# Patient Record
Sex: FEMALE | Race: BLACK OR AFRICAN AMERICAN | URBAN - METROPOLITAN AREA
[De-identification: names, ages, dates, MRNs, and addresses within clinical notes are randomized per-mention and may not be internally consistent; named-entity substitution may affect disease eponyms.]

---

## 2020-07-01 ENCOUNTER — APPOINTMENT (OUTPATIENT)
Dept: GENERAL RADIOLOGY | Age: 55
End: 2020-07-01
Payer: MEDICAID

## 2020-07-01 ENCOUNTER — HOSPITAL ENCOUNTER (EMERGENCY)
Age: 55
Discharge: HOME | End: 2020-07-01
Payer: MEDICAID

## 2020-07-01 VITALS
TEMPERATURE: 97.6 F | WEIGHT: 211.86 LBS | SYSTOLIC BLOOD PRESSURE: 151 MMHG | HEIGHT: 64 IN | BODY MASS INDEX: 36.17 KG/M2 | DIASTOLIC BLOOD PRESSURE: 88 MMHG | HEART RATE: 88 BPM | OXYGEN SATURATION: 97 % | RESPIRATION RATE: 18 BRPM

## 2020-07-01 PROCEDURE — 93005 ELECTROCARDIOGRAM TRACING: CPT | Performed by: PHYSICIAN ASSISTANT

## 2020-07-01 PROCEDURE — 93005 ELECTROCARDIOGRAM TRACING: CPT

## 2020-07-01 PROCEDURE — 71101 X-RAY EXAM UNILAT RIBS/CHEST: CPT

## 2020-07-01 PROCEDURE — 99284 EMERGENCY DEPT VISIT MOD MDM: CPT

## 2020-07-01 PROCEDURE — 6370000000 HC RX 637 (ALT 250 FOR IP): Performed by: PHYSICIAN ASSISTANT

## 2020-07-01 RX ORDER — NAPROXEN 250 MG/1
500 TABLET ORAL ONCE
Status: COMPLETED | OUTPATIENT
Start: 2020-07-01 | End: 2020-07-01

## 2020-07-01 RX ORDER — CYCLOBENZAPRINE HCL 10 MG
10 TABLET ORAL ONCE
Status: COMPLETED | OUTPATIENT
Start: 2020-07-01 | End: 2020-07-01

## 2020-07-01 RX ORDER — ACETAMINOPHEN 500 MG
500 TABLET ORAL EVERY 6 HOURS PRN
Qty: 30 TABLET | Refills: 0 | Status: SHIPPED | OUTPATIENT
Start: 2020-07-01

## 2020-07-01 RX ORDER — HYDROCODONE BITARTRATE AND ACETAMINOPHEN 5; 325 MG/1; MG/1
1 TABLET ORAL EVERY 8 HOURS PRN
Qty: 6 TABLET | Refills: 0 | Status: SHIPPED | OUTPATIENT
Start: 2020-07-01 | End: 2020-07-04

## 2020-07-01 RX ORDER — OXYCODONE HYDROCHLORIDE AND ACETAMINOPHEN 5; 325 MG/1; MG/1
1 TABLET ORAL ONCE
Status: COMPLETED | OUTPATIENT
Start: 2020-07-01 | End: 2020-07-01

## 2020-07-01 RX ORDER — LIDOCAINE 50 MG/G
1 PATCH TOPICAL DAILY
Qty: 10 PATCH | Refills: 0 | Status: SHIPPED | OUTPATIENT
Start: 2020-07-01 | End: 2020-07-11

## 2020-07-01 RX ORDER — LIDOCAINE 4 G/G
1 PATCH TOPICAL DAILY
Status: DISCONTINUED | OUTPATIENT
Start: 2020-07-01 | End: 2020-07-01 | Stop reason: HOSPADM

## 2020-07-01 RX ADMIN — NAPROXEN 500 MG: 250 TABLET ORAL at 14:37

## 2020-07-01 RX ADMIN — OXYCODONE HYDROCHLORIDE AND ACETAMINOPHEN 1 TABLET: 5; 325 TABLET ORAL at 14:37

## 2020-07-01 RX ADMIN — CYCLOBENZAPRINE 10 MG: 10 TABLET, FILM COATED ORAL at 14:37

## 2020-07-01 ASSESSMENT — PAIN DESCRIPTION - DESCRIPTORS
DESCRIPTORS: ACHING;CONSTANT;SHARP
DESCRIPTORS: DULL;ACHING

## 2020-07-01 ASSESSMENT — PAIN DESCRIPTION - ONSET
ONSET: ON-GOING
ONSET: ON-GOING

## 2020-07-01 ASSESSMENT — PAIN DESCRIPTION - PAIN TYPE
TYPE: ACUTE PAIN
TYPE: ACUTE PAIN

## 2020-07-01 ASSESSMENT — ENCOUNTER SYMPTOMS
BACK PAIN: 0
VOMITING: 0
DIARRHEA: 0
SHORTNESS OF BREATH: 0
ABDOMINAL PAIN: 0
COUGH: 0
NAUSEA: 0
EYE PAIN: 0

## 2020-07-01 ASSESSMENT — PAIN DESCRIPTION - LOCATION
LOCATION: BACK
LOCATION: RIB CAGE

## 2020-07-01 ASSESSMENT — PAIN DESCRIPTION - PROGRESSION
CLINICAL_PROGRESSION: GRADUALLY WORSENING
CLINICAL_PROGRESSION: GRADUALLY IMPROVING

## 2020-07-01 ASSESSMENT — PAIN SCALES - GENERAL
PAINLEVEL_OUTOF10: 10
PAINLEVEL_OUTOF10: 1
PAINLEVEL_OUTOF10: 10

## 2020-07-01 ASSESSMENT — PAIN DESCRIPTION - FREQUENCY
FREQUENCY: CONTINUOUS
FREQUENCY: CONTINUOUS

## 2020-07-01 ASSESSMENT — PAIN - FUNCTIONAL ASSESSMENT
PAIN_FUNCTIONAL_ASSESSMENT: PREVENTS OR INTERFERES SOME ACTIVE ACTIVITIES AND ADLS
PAIN_FUNCTIONAL_ASSESSMENT: 0-10
PAIN_FUNCTIONAL_ASSESSMENT: ACTIVITIES ARE NOT PREVENTED

## 2020-07-01 ASSESSMENT — PAIN DESCRIPTION - ORIENTATION
ORIENTATION: LEFT
ORIENTATION: LEFT

## 2020-07-01 ASSESSMENT — PAIN SCALES - WONG BAKER: WONGBAKER_NUMERICALRESPONSE: 10
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
## 2020-07-01 NOTE — ED PROVIDER NOTES
Attending Note:    The patient was seen and examined by the mid-level provider. I was asked to provide EKG interpretation only by the midlevel provider. DIAGNOSTIC RESULTS     EKG: All EKG's are interpreted by the Emergency Department Physician who either signs or Co-signs this chart in the absence of a cardiologist.    Normal sinus rhythm. Rate 66. AR interval 176 ms. QRS duration 98 ms. QTc 448 ms. R axis -19 degrees. No ST elevation. No acute change.          Florecita Kelly DO  07/01/20 9046

## 2020-07-02 LAB
EKG ATRIAL RATE: 66 BPM
EKG DIAGNOSIS: NORMAL
EKG P AXIS: 57 DEGREES
EKG P-R INTERVAL: 176 MS
EKG Q-T INTERVAL: 428 MS
EKG QRS DURATION: 98 MS
EKG QTC CALCULATION (BAZETT): 448 MS
EKG R AXIS: -19 DEGREES
EKG T AXIS: 38 DEGREES
EKG VENTRICULAR RATE: 66 BPM

## 2020-07-02 PROCEDURE — 93010 ELECTROCARDIOGRAM REPORT: CPT | Performed by: INTERNAL MEDICINE

## 2020-07-09 ENCOUNTER — APPOINTMENT (OUTPATIENT)
Dept: CT IMAGING | Age: 55
End: 2020-07-09
Payer: MEDICAID

## 2020-07-09 ENCOUNTER — APPOINTMENT (OUTPATIENT)
Dept: GENERAL RADIOLOGY | Age: 55
End: 2020-07-09
Payer: MEDICAID

## 2020-07-09 ENCOUNTER — HOSPITAL ENCOUNTER (EMERGENCY)
Age: 55
Discharge: HOME | End: 2020-07-09
Attending: EMERGENCY MEDICINE
Payer: MEDICAID

## 2020-07-09 VITALS
HEIGHT: 64 IN | SYSTOLIC BLOOD PRESSURE: 123 MMHG | BODY MASS INDEX: 36.17 KG/M2 | RESPIRATION RATE: 17 BRPM | WEIGHT: 211.86 LBS | OXYGEN SATURATION: 98 % | DIASTOLIC BLOOD PRESSURE: 70 MMHG | TEMPERATURE: 98 F | HEART RATE: 72 BPM

## 2020-07-09 LAB
ANION GAP SERPL CALCULATED.3IONS-SCNC: 15 MMOL/L (ref 3–16)
BASOPHILS ABSOLUTE: 0 K/UL (ref 0–0.2)
BASOPHILS RELATIVE PERCENT: 0.5 %
BUN BLDV-MCNC: 9 MG/DL (ref 7–20)
CALCIUM SERPL-MCNC: 9.5 MG/DL (ref 8.3–10.6)
CHLORIDE BLD-SCNC: 104 MMOL/L (ref 99–110)
CO2: 19 MMOL/L (ref 21–32)
CREAT SERPL-MCNC: 0.6 MG/DL (ref 0.6–1.1)
EKG ATRIAL RATE: 69 BPM
EKG DIAGNOSIS: NORMAL
EKG P AXIS: 59 DEGREES
EKG P-R INTERVAL: 136 MS
EKG Q-T INTERVAL: 414 MS
EKG QRS DURATION: 96 MS
EKG QTC CALCULATION (BAZETT): 443 MS
EKG R AXIS: -11 DEGREES
EKG T AXIS: 68 DEGREES
EKG VENTRICULAR RATE: 69 BPM
EOSINOPHILS ABSOLUTE: 0.1 K/UL (ref 0–0.6)
EOSINOPHILS RELATIVE PERCENT: 1.4 %
GFR AFRICAN AMERICAN: >60
GFR NON-AFRICAN AMERICAN: >60
GLUCOSE BLD-MCNC: 83 MG/DL (ref 70–99)
HCT VFR BLD CALC: 42 % (ref 36–48)
HEMOGLOBIN: 14.1 G/DL (ref 12–16)
LYMPHOCYTES ABSOLUTE: 1.7 K/UL (ref 1–5.1)
LYMPHOCYTES RELATIVE PERCENT: 30 %
MCH RBC QN AUTO: 32.5 PG (ref 26–34)
MCHC RBC AUTO-ENTMCNC: 33.5 G/DL (ref 31–36)
MCV RBC AUTO: 97.3 FL (ref 80–100)
MONOCYTES ABSOLUTE: 0.4 K/UL (ref 0–1.3)
MONOCYTES RELATIVE PERCENT: 6.8 %
NEUTROPHILS ABSOLUTE: 3.4 K/UL (ref 1.7–7.7)
NEUTROPHILS RELATIVE PERCENT: 61.3 %
PDW BLD-RTO: 13.6 % (ref 12.4–15.4)
PLATELET # BLD: 225 K/UL (ref 135–450)
PMV BLD AUTO: 9 FL (ref 5–10.5)
POTASSIUM SERPL-SCNC: 3.6 MMOL/L (ref 3.5–5.1)
RBC # BLD: 4.32 M/UL (ref 4–5.2)
SARS-COV-2, NAAT: NOT DETECTED
SODIUM BLD-SCNC: 138 MMOL/L (ref 136–145)
WBC # BLD: 5.6 K/UL (ref 4–11)

## 2020-07-09 PROCEDURE — 80048 BASIC METABOLIC PNL TOTAL CA: CPT

## 2020-07-09 PROCEDURE — 93010 ELECTROCARDIOGRAM REPORT: CPT | Performed by: INTERNAL MEDICINE

## 2020-07-09 PROCEDURE — 85025 COMPLETE CBC W/AUTO DIFF WBC: CPT

## 2020-07-09 PROCEDURE — 70450 CT HEAD/BRAIN W/O DYE: CPT

## 2020-07-09 PROCEDURE — 6370000000 HC RX 637 (ALT 250 FOR IP): Performed by: PHYSICIAN ASSISTANT

## 2020-07-09 PROCEDURE — 36415 COLL VENOUS BLD VENIPUNCTURE: CPT

## 2020-07-09 PROCEDURE — 99284 EMERGENCY DEPT VISIT MOD MDM: CPT

## 2020-07-09 PROCEDURE — 93005 ELECTROCARDIOGRAM TRACING: CPT | Performed by: PHYSICIAN ASSISTANT

## 2020-07-09 PROCEDURE — 71101 X-RAY EXAM UNILAT RIBS/CHEST: CPT

## 2020-07-09 PROCEDURE — U0002 COVID-19 LAB TEST NON-CDC: HCPCS

## 2020-07-09 RX ORDER — ACETAMINOPHEN 325 MG/1
650 TABLET ORAL ONCE
Status: COMPLETED | OUTPATIENT
Start: 2020-07-09 | End: 2020-07-09

## 2020-07-09 RX ORDER — HYDROCODONE BITARTRATE AND ACETAMINOPHEN 5; 325 MG/1; MG/1
1 TABLET ORAL EVERY 4 HOURS PRN
Qty: 18 TABLET | Refills: 0 | Status: SHIPPED | OUTPATIENT
Start: 2020-07-09 | End: 2020-07-12

## 2020-07-09 RX ADMIN — ACETAMINOPHEN 650 MG: 325 TABLET, FILM COATED ORAL at 14:37

## 2020-07-09 ASSESSMENT — PAIN SCALES - GENERAL
PAINLEVEL_OUTOF10: 10
PAINLEVEL_OUTOF10: 10

## 2020-07-09 ASSESSMENT — ENCOUNTER SYMPTOMS
COLOR CHANGE: 0
VOMITING: 0
NAUSEA: 0
ABDOMINAL PAIN: 0
BACK PAIN: 0
SHORTNESS OF BREATH: 0
COUGH: 0

## 2020-07-09 ASSESSMENT — PAIN DESCRIPTION - PROGRESSION: CLINICAL_PROGRESSION: NOT CHANGED

## 2020-07-09 ASSESSMENT — PAIN DESCRIPTION - ORIENTATION: ORIENTATION: ANTERIOR

## 2020-07-09 ASSESSMENT — PAIN DESCRIPTION - FREQUENCY: FREQUENCY: CONTINUOUS

## 2020-07-09 ASSESSMENT — PAIN DESCRIPTION - ONSET: ONSET: ON-GOING

## 2020-07-09 ASSESSMENT — PAIN - FUNCTIONAL ASSESSMENT: PAIN_FUNCTIONAL_ASSESSMENT: PREVENTS OR INTERFERES SOME ACTIVE ACTIVITIES AND ADLS

## 2020-07-09 ASSESSMENT — PAIN DESCRIPTION - PAIN TYPE: TYPE: ACUTE PAIN

## 2020-07-09 ASSESSMENT — PAIN DESCRIPTION - LOCATION: LOCATION: HEAD
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
## 2023-01-13 ENCOUNTER — APPOINTMENT (OUTPATIENT)
Dept: GENERAL RADIOLOGY | Age: 58
End: 2023-01-13
Payer: MEDICAID

## 2023-01-13 ENCOUNTER — HOSPITAL ENCOUNTER (EMERGENCY)
Age: 58
Discharge: HOME | End: 2023-01-13
Payer: MEDICAID

## 2023-01-13 VITALS
TEMPERATURE: 97.7 F | HEART RATE: 98 BPM | OXYGEN SATURATION: 98 % | DIASTOLIC BLOOD PRESSURE: 77 MMHG | HEIGHT: 64 IN | SYSTOLIC BLOOD PRESSURE: 144 MMHG | WEIGHT: 178.35 LBS | RESPIRATION RATE: 16 BRPM | BODY MASS INDEX: 30.45 KG/M2

## 2023-01-13 DIAGNOSIS — R06.02 SHORTNESS OF BREATH: Primary | ICD-10-CM

## 2023-01-13 LAB
A/G RATIO: 0.9 (ref 1.1–2.2)
ALBUMIN SERPL-MCNC: 3.5 G/DL (ref 3.4–5)
ALP BLD-CCNC: 214 U/L (ref 40–129)
ALT SERPL-CCNC: 36 U/L (ref 10–40)
ANION GAP SERPL CALCULATED.3IONS-SCNC: 12 MMOL/L (ref 3–16)
AST SERPL-CCNC: 46 U/L (ref 15–37)
BASOPHILS ABSOLUTE: 0 K/UL (ref 0–0.2)
BASOPHILS RELATIVE PERCENT: 0.4 %
BILIRUB SERPL-MCNC: 0.7 MG/DL (ref 0–1)
BUN BLDV-MCNC: 5 MG/DL (ref 7–20)
CALCIUM SERPL-MCNC: 9.7 MG/DL (ref 8.3–10.6)
CHLORIDE BLD-SCNC: 101 MMOL/L (ref 99–110)
CO2: 26 MMOL/L (ref 21–32)
CREAT SERPL-MCNC: 1 MG/DL (ref 0.6–1.1)
EOSINOPHILS ABSOLUTE: 0.1 K/UL (ref 0–0.6)
EOSINOPHILS RELATIVE PERCENT: 1.3 %
GFR SERPL CREATININE-BSD FRML MDRD: >60 ML/MIN/{1.73_M2}
GLUCOSE BLD-MCNC: 89 MG/DL (ref 70–99)
HCT VFR BLD CALC: 33.5 % (ref 36–48)
HEMOGLOBIN: 10.6 G/DL (ref 12–16)
LYMPHOCYTES ABSOLUTE: 1.1 K/UL (ref 1–5.1)
LYMPHOCYTES RELATIVE PERCENT: 15.3 %
MAGNESIUM: 2.8 MG/DL (ref 1.8–2.4)
MCH RBC QN AUTO: 25.8 PG (ref 26–34)
MCHC RBC AUTO-ENTMCNC: 31.7 G/DL (ref 31–36)
MCV RBC AUTO: 81.5 FL (ref 80–100)
MONOCYTES ABSOLUTE: 0.4 K/UL (ref 0–1.3)
MONOCYTES RELATIVE PERCENT: 5.4 %
NEUTROPHILS ABSOLUTE: 5.8 K/UL (ref 1.7–7.7)
NEUTROPHILS RELATIVE PERCENT: 77.6 %
PDW BLD-RTO: 16.6 % (ref 12.4–15.4)
PLATELET # BLD: 472 K/UL (ref 135–450)
PMV BLD AUTO: 7.7 FL (ref 5–10.5)
POTASSIUM REFLEX MAGNESIUM: 3.5 MMOL/L (ref 3.5–5.1)
PRO-BNP: 50 PG/ML (ref 0–124)
RAPID INFLUENZA  B AGN: NEGATIVE
RAPID INFLUENZA A AGN: NEGATIVE
RBC # BLD: 4.11 M/UL (ref 4–5.2)
SARS-COV-2, NAAT: NOT DETECTED
SODIUM BLD-SCNC: 139 MMOL/L (ref 136–145)
TOTAL PROTEIN: 7.3 G/DL (ref 6.4–8.2)
TROPONIN: <0.01 NG/ML
WBC # BLD: 7.5 K/UL (ref 4–11)

## 2023-01-13 PROCEDURE — 84484 ASSAY OF TROPONIN QUANT: CPT

## 2023-01-13 PROCEDURE — 94640 AIRWAY INHALATION TREATMENT: CPT

## 2023-01-13 PROCEDURE — 85025 COMPLETE CBC W/AUTO DIFF WBC: CPT

## 2023-01-13 PROCEDURE — 87635 SARS-COV-2 COVID-19 AMP PRB: CPT

## 2023-01-13 PROCEDURE — 71046 X-RAY EXAM CHEST 2 VIEWS: CPT

## 2023-01-13 PROCEDURE — 83880 ASSAY OF NATRIURETIC PEPTIDE: CPT

## 2023-01-13 PROCEDURE — 83735 ASSAY OF MAGNESIUM: CPT

## 2023-01-13 PROCEDURE — 99285 EMERGENCY DEPT VISIT HI MDM: CPT

## 2023-01-13 PROCEDURE — 80053 COMPREHEN METABOLIC PANEL: CPT

## 2023-01-13 PROCEDURE — 6370000000 HC RX 637 (ALT 250 FOR IP): Performed by: PHYSICIAN ASSISTANT

## 2023-01-13 PROCEDURE — 87804 INFLUENZA ASSAY W/OPTIC: CPT

## 2023-01-13 PROCEDURE — 93005 ELECTROCARDIOGRAM TRACING: CPT | Performed by: PHYSICIAN ASSISTANT

## 2023-01-13 PROCEDURE — 94760 N-INVAS EAR/PLS OXIMETRY 1: CPT

## 2023-01-13 RX ORDER — PREDNISONE 20 MG/1
20 TABLET ORAL 2 TIMES DAILY
Qty: 10 TABLET | Refills: 0 | Status: SHIPPED | OUTPATIENT
Start: 2023-01-13 | End: 2023-01-18

## 2023-01-13 RX ORDER — AZITHROMYCIN 250 MG/1
250 TABLET, FILM COATED ORAL SEE ADMIN INSTRUCTIONS
Qty: 6 TABLET | Refills: 0 | Status: SHIPPED | OUTPATIENT
Start: 2023-01-13 | End: 2023-01-18

## 2023-01-13 RX ORDER — IPRATROPIUM BROMIDE AND ALBUTEROL SULFATE 2.5; .5 MG/3ML; MG/3ML
1 SOLUTION RESPIRATORY (INHALATION) ONCE
Status: COMPLETED | OUTPATIENT
Start: 2023-01-13 | End: 2023-01-13

## 2023-01-13 RX ADMIN — IPRATROPIUM BROMIDE AND ALBUTEROL SULFATE 1 AMPULE: .5; 3 SOLUTION RESPIRATORY (INHALATION) at 15:50

## 2023-01-13 ASSESSMENT — PAIN - FUNCTIONAL ASSESSMENT: PAIN_FUNCTIONAL_ASSESSMENT: NONE - DENIES PAIN

## 2023-01-14 ASSESSMENT — ENCOUNTER SYMPTOMS
CONSTIPATION: 0
SHORTNESS OF BREATH: 1
COUGH: 1
ABDOMINAL PAIN: 0
SORE THROAT: 0
VOMITING: 0
EYE PAIN: 0
EYE REDNESS: 0
BACK PAIN: 0
NAUSEA: 0
DIARRHEA: 0

## 2023-01-14 NOTE — ED PROVIDER NOTES
[address] [facility]        Pt Name: Johnny Martinez  MRN: [id number]  [place] 1965  Date of evaluation: 1/13/2023  Provider: Ann Parnell PA-C  PCP: XIN Ashby CNP  Note Started: 8:13 AM EST 1/14/23      ANILA. I have evaluated this patient. My supervising physician was available for consultation. CHIEF COMPLAINT       Chief Complaint   Patient presents with    Shortness of Breath     HIV+, SOB x2-3 days, + dry cough, states that it is hard for her to talk at times       HISTORY OF PRESENT ILLNESS: 1 or more Elements     History from : Patient    Limitations to history : None    Johnny Martinez is a 65 y.o. female who presents to the emergency department with complaint of a cough and some shortness of breath that has been present over the past 2 to 3 days. Been about the same since it started. She states that most of the shortness of breath stems from the throat rather than the chest itself. When she takes her inhalers at home this improves her symptoms. She denies any fevers or chills, productive cough, chest pain, abdominal pain, change in urination or bowel movements. She does have a PMH of HIV and has not seen a physician in the past year. Nursing Notes were all reviewed and agreed with or any disagreements were addressed in the HPI. REVIEW OF SYSTEMS :      Review of Systems   Constitutional:  Negative for chills and fever. HENT:  Negative for ear pain and sore throat. Eyes:  Negative for pain and redness. Respiratory:  Positive for cough and shortness of breath. Cardiovascular:  Negative for chest pain and leg swelling. Gastrointestinal:  Negative for abdominal pain, constipation, diarrhea, nausea and vomiting. Genitourinary:  Negative for dysuria and hematuria. Musculoskeletal:  Negative for back pain and neck pain. Skin:  Negative for rash and wound.    Neurological:  Negative for light-headedness and headaches. Positives and Pertinent negatives as per HPI. SURGICAL HISTORY   History reviewed. No pertinent surgical history. [address]       Discharge Medication List as of 1/13/2023  5:28 PM        CONTINUE these medications which have NOT CHANGED    Details   acetaminophen (APAP EXTRA STRENGTH) 500 MG tablet Take 1 tablet by mouth every 6 hours as needed for Pain, Disp-30 tablet, R-0Print             ALLERGIES     Patient has no known allergies. FAMILYHISTORY     History reviewed. No pertinent family history. SOCIAL HISTORY       Social History     Tobacco Use    Smoking status: Never    Smokeless tobacco: Never   Substance Use Topics    Alcohol use: Not Currently    Drug use: Not Currently       SCREENINGS        Mary Coma Scale  Eye Opening: Spontaneous  Best Verbal Response: Oriented  Best Motor Response: Obeys commands  Mary Coma Scale Score: 15                CIWA Assessment  BP: (!) 144/77  Heart Rate: 98           PHYSICAL EXAM  1 or more Elements     ED Triage Vitals [01/13/23 1227]   BP Temp Temp Source Heart Rate Resp SpO2 Height Weight   (!) 151/87 97.7 °F (36.5 °C) Oral 99 20 96 % 5' 4\" (1.626 m) 178 lb 5.6 oz (80.9 kg)       Physical Exam  Constitutional:       General: She is not in acute distress. Appearance: Normal appearance. She is not ill-appearing, toxic-appearing or diaphoretic. HENT:      Head: Normocephalic and atraumatic. Right Ear: External ear normal.      Left Ear: External ear normal.      Nose: Nose normal.   Eyes:      General:         Right eye: No discharge. Left eye: No discharge. Cardiovascular:      Rate and Rhythm: Normal rate and regular rhythm. Pulses: Normal pulses. Heart sounds: Normal heart sounds. No murmur heard. No gallop. Pulmonary:      Effort: Pulmonary effort is normal. No respiratory distress. Breath sounds: Normal breath sounds. No stridor. No wheezing, rhonchi or rales. Musculoskeletal:         General: Normal range of motion. Cervical back: Normal range of motion. Skin:     General: Skin is warm and dry. Neurological:      General: No focal deficit present. Mental Status: She is alert and oriented to person, place, and time. Psychiatric:         Mood and Affect: Mood normal.         Behavior: Behavior normal.       DIAGNOSTIC RESULTS   LABS:    Labs Reviewed   CBC WITH AUTO DIFFERENTIAL - Abnormal; Notable for the following components:       Result Value    Hemoglobin 10.6 (*)     Hematocrit 33.5 (*)     MCH 25.8 (*)     RDW 16.6 (*)     Platelets 038 (*)     All other components within normal limits   COMPREHENSIVE METABOLIC PANEL W/ REFLEX TO MG FOR LOW K - Abnormal; Notable for the following components:    BUN 5 (*)     Albumin/Globulin Ratio 0.9 (*)     Alkaline Phosphatase 214 (*)     AST 46 (*)     All other components within normal limits   MAGNESIUM - Abnormal; Notable for the following components:    Magnesium 2.80 (*)     All other components within normal limits   COVID-19, RAPID   RAPID INFLUENZA A/B ANTIGENS   BRAIN NATRIURETIC PEPTIDE   TROPONIN       When ordered only abnormal lab results are displayed. All other labs were within normal range or not returned as of this dictation. EKG: When ordered, EKG's are interpreted by the Emergency Department Physician in the absence of a cardiologist.  Please see their note for interpretation of EKG. RADIOLOGY:   Non-plain film images such as CT, Ultrasound and MRI are read by the radiologist. Plain radiographic images are visualized and preliminarily interpreted by the ED Provider with the below findings:        Interpretation per the Radiologist below, if available at the time of this note:    XR CHEST (2 VW)   Final Result   No acute abnormality           XR CHEST (2 VW)    Result Date: 1/13/2023  EXAMINATION: TWO XRAY VIEWS OF THE CHEST 1/13/2023 2:28 pm COMPARISON: None.  HISTORY: ORDERING SYSTEM PROVIDED HISTORY: shortness of breath TECHNOLOGIST PROVIDED HISTORY: Reason for exam:->shortness of breath Reason for Exam: SOB FINDINGS: The heart and pulmonary vascularity are within normal limits. There are no focal areas of consolidation or pleural effusion. The osseous structures are intact. No acute abnormality       No results found. PROCEDURES   Unless otherwise noted below, none     Procedures    CRITICAL CARE TIME (.cctime)       PAST MEDICAL HISTORY      has a past medical history of AIDS (acquired immune deficiency syndrome) ([facility] [address] Chronic Conditions affecting Care:    EMERGENCY DEPARTMENT COURSE and DIFFERENTIAL DIAGNOSIS/MDM:   Vitals:    Vitals:    01/13/23 1227 01/13/23 1550 01/13/23 1730   BP: (!) 151/87  (!) 144/77   Pulse: 99  98   Resp: 20 18 16   Temp: 97.7 °F (36.5 °C)     TempSrc: Oral     SpO2: 96% 99% 98%   Weight: 178 lb 5.6 oz (80.9 kg)     Height: 5' 4\" (1.626 m)         Patient was given the following medications:  Medications   ipratropium-albuterol (DUONEB) nebulizer solution 1 ampule (1 ampule Inhalation Given 1/13/23 1550)             Is this patient to be included in the SEP-1 Core Measure due to severe sepsis or septic shock? No   Exclusion criteria - the patient is NOT to be included for SEP-1 Core Measure due to: Infection is not suspected    CONSULTS: (Who and What was discussed)  None      Social Determinants : None        CC/HPI Summary, DDx, ED Course, and Reassessment: This is a 65y.o. year old, well-appearing female with  has a past medical history of AIDS (acquired immune deficiency syndrome) ([facility] [address] who presents to the ED with complaint of cough that began 2 to 3 days ago. .   Vitals upon arrival show hypertensive, otherwise within normal limits. Physical Exam shows as above.     Blood work was done and shows:   -Within normal limits  Covid/Flu: Negative    EKG: interpreted by my attending, please see note     Imaging was reviewed and interpreted by radiologist as above showing:   -No acute abnormality      Ddx includes: COVID, flu, pneumonia, asthma exacerbation, CHF exacerbation, PE, other  Patient is PERC negative if wasn't for her age so a CT chest PE was not performed  Management Given:  -duoneb, symptoms improved--significantly  States she is ready to go home    Disposition: discharge   Patient was informed to return to the Emergency Department if any new worsening or more concerning symptoms occur, in agreement with plan. Shared decision making was practiced, and patient discharged in stable condition. Informed to follow up with PCP  for further evaluation. Medications were prescribed as below. All questions were answered. Disposition Considerations (include 1 Tests not done, Shared Decision Making, Pt Expectation of Test or Tx.): Considered CT chest however perc negative despite her age, symptoms improved              I am the Primary Clinician of Record. FINAL IMPRESSION      1.  Shortness of breath          DISPOSITION/PLAN     DISPOSITION Decision To Discharge 01/13/2023 05:27:04 PM      PATIENT REFERRED TO:  XIN Ochoa CNP [address] [place] [address] [address]  [phone]    Schedule an appointment as soon as possible for a visit in 1 day  for reevaluation    [facility] Emergency Department  [address] [facility] [phone] [id number]  [phone]  Go to   As needed, If symptoms worsen      DISCHARGE MEDICATIONS:  Discharge Medication List as of 1/13/2023  5:28 PM        START taking these medications    Details   predniSONE (DELTASONE) 20 MG tablet Take 1 tablet by mouth 2 times daily for 5 days, Disp-10 tablet, R-0Normal      azithromycin (ZITHROMAX) 250 MG tablet Take 1 tablet by mouth See Admin Instructions for 5 days 500mg on day 1 followed by 250mg on days 2 - 5, Disp-6 tablet, R-0Normal             DISCONTINUED MEDICATIONS:  Discharge Medication List as of 1/13/2023  5:28 PM (Please note that portions of this note were completed with a voice recognition program.  Efforts were made to edit the dictations but occasionally words are mis-transcribed.)    Carlos Alberto Huber PA-C (electronically signed)           Carlos Alberto Huber PA-C  01/14/23 0802

## 2023-01-15 LAB
EKG ATRIAL RATE: 82 BPM
EKG DIAGNOSIS: NORMAL
EKG P AXIS: 75 DEGREES
EKG P-R INTERVAL: 152 MS
EKG Q-T INTERVAL: 394 MS
EKG QRS DURATION: 80 MS
EKG QTC CALCULATION (BAZETT): 460 MS
EKG R AXIS: -1 DEGREES
EKG T AXIS: 34 DEGREES
EKG VENTRICULAR RATE: 82 BPM

## 2023-01-15 PROCEDURE — 93010 ELECTROCARDIOGRAM REPORT: CPT | Performed by: INTERNAL MEDICINE

## 2023-03-02 ENCOUNTER — APPOINTMENT (OUTPATIENT)
Dept: CT IMAGING | Age: 58
DRG: 240 | End: 2023-03-02
Payer: MEDICAID

## 2023-03-02 ENCOUNTER — APPOINTMENT (OUTPATIENT)
Dept: GENERAL RADIOLOGY | Age: 58
DRG: 240 | End: 2023-03-02
Payer: MEDICAID

## 2023-03-02 ENCOUNTER — HOSPITAL ENCOUNTER (INPATIENT)
Age: 58
LOS: 6 days | Discharge: HOME | DRG: 240 | End: 2023-03-08
Attending: PEDIATRICS | Admitting: PEDIATRICS
Payer: MEDICAID

## 2023-03-02 DIAGNOSIS — R06.82 TACHYPNEA: ICD-10-CM

## 2023-03-02 DIAGNOSIS — C78.7 LIVER METASTASES (HCC): ICD-10-CM

## 2023-03-02 DIAGNOSIS — R00.0 TACHYCARDIA: ICD-10-CM

## 2023-03-02 DIAGNOSIS — C78.7 LIVER METASTASIS (HCC): ICD-10-CM

## 2023-03-02 DIAGNOSIS — D64.9 ANEMIA, UNSPECIFIED TYPE: ICD-10-CM

## 2023-03-02 DIAGNOSIS — R06.02 SHORTNESS OF BREATH: Primary | ICD-10-CM

## 2023-03-02 DIAGNOSIS — C18.9 MALIGNANT NEOPLASM OF COLON, UNSPECIFIED PART OF COLON (HCC): ICD-10-CM

## 2023-03-02 PROBLEM — R06.00 DYSPNEA: Status: ACTIVE | Noted: 2023-03-02

## 2023-03-02 LAB
ANION GAP SERPL CALCULATED.3IONS-SCNC: 14 MMOL/L (ref 3–16)
BASOPHILS ABSOLUTE: 0.1 K/UL (ref 0–0.2)
BASOPHILS RELATIVE PERCENT: 0.7 %
BUN BLDV-MCNC: 7 MG/DL (ref 7–20)
CALCIUM SERPL-MCNC: 8.7 MG/DL (ref 8.3–10.6)
CHLORIDE BLD-SCNC: 100 MMOL/L (ref 99–110)
CO2: 25 MMOL/L (ref 21–32)
CREAT SERPL-MCNC: 0.9 MG/DL (ref 0.6–1.1)
EKG ATRIAL RATE: 116 BPM
EKG DIAGNOSIS: NORMAL
EKG P AXIS: 82 DEGREES
EKG P-R INTERVAL: 126 MS
EKG Q-T INTERVAL: 320 MS
EKG QRS DURATION: 80 MS
EKG QTC CALCULATION (BAZETT): 444 MS
EKG R AXIS: -8 DEGREES
EKG T AXIS: 59 DEGREES
EKG VENTRICULAR RATE: 116 BPM
EOSINOPHILS ABSOLUTE: 0 K/UL (ref 0–0.6)
EOSINOPHILS RELATIVE PERCENT: 0.4 %
GFR SERPL CREATININE-BSD FRML MDRD: >60 ML/MIN/{1.73_M2}
GLUCOSE BLD-MCNC: 116 MG/DL (ref 70–99)
HCT VFR BLD CALC: 29.9 % (ref 36–48)
HEMOGLOBIN: 9.7 G/DL (ref 12–16)
LYMPHOCYTES ABSOLUTE: 1.6 K/UL (ref 1–5.1)
LYMPHOCYTES RELATIVE PERCENT: 15.6 %
MCH RBC QN AUTO: 25.9 PG (ref 26–34)
MCHC RBC AUTO-ENTMCNC: 32.3 G/DL (ref 31–36)
MCV RBC AUTO: 80.2 FL (ref 80–100)
MONOCYTES ABSOLUTE: 0.6 K/UL (ref 0–1.3)
MONOCYTES RELATIVE PERCENT: 6 %
NEUTROPHILS ABSOLUTE: 8.1 K/UL (ref 1.7–7.7)
NEUTROPHILS RELATIVE PERCENT: 77.3 %
OCCULT BLOOD DIAGNOSTIC: NORMAL
PDW BLD-RTO: 21.6 % (ref 12.4–15.4)
PLATELET # BLD: 498 K/UL (ref 135–450)
PMV BLD AUTO: 8 FL (ref 5–10.5)
POTASSIUM REFLEX MAGNESIUM: 4.6 MMOL/L (ref 3.5–5.1)
PRO-BNP: 163 PG/ML (ref 0–124)
RBC # BLD: 3.74 M/UL (ref 4–5.2)
SODIUM BLD-SCNC: 139 MMOL/L (ref 136–145)
TROPONIN: <0.01 NG/ML
WBC # BLD: 10.4 K/UL (ref 4–11)

## 2023-03-02 PROCEDURE — 6370000000 HC RX 637 (ALT 250 FOR IP): Performed by: PEDIATRICS

## 2023-03-02 PROCEDURE — 96361 HYDRATE IV INFUSION ADD-ON: CPT

## 2023-03-02 PROCEDURE — 99285 EMERGENCY DEPT VISIT HI MDM: CPT

## 2023-03-02 PROCEDURE — 2580000003 HC RX 258: Performed by: PHYSICIAN ASSISTANT

## 2023-03-02 PROCEDURE — 82270 OCCULT BLOOD FECES: CPT

## 2023-03-02 PROCEDURE — 93010 ELECTROCARDIOGRAM REPORT: CPT | Performed by: INTERNAL MEDICINE

## 2023-03-02 PROCEDURE — 83880 ASSAY OF NATRIURETIC PEPTIDE: CPT

## 2023-03-02 PROCEDURE — 6360000004 HC RX CONTRAST MEDICATION: Performed by: PHYSICIAN ASSISTANT

## 2023-03-02 PROCEDURE — 6370000000 HC RX 637 (ALT 250 FOR IP): Performed by: NURSE PRACTITIONER

## 2023-03-02 PROCEDURE — 93005 ELECTROCARDIOGRAM TRACING: CPT | Performed by: EMERGENCY MEDICINE

## 2023-03-02 PROCEDURE — 85025 COMPLETE CBC W/AUTO DIFF WBC: CPT

## 2023-03-02 PROCEDURE — 99151 MOD SED SAME PHYS/QHP <5 YRS: CPT

## 2023-03-02 PROCEDURE — 96360 HYDRATION IV INFUSION INIT: CPT

## 2023-03-02 PROCEDURE — 71046 X-RAY EXAM CHEST 2 VIEWS: CPT

## 2023-03-02 PROCEDURE — 80048 BASIC METABOLIC PNL TOTAL CA: CPT

## 2023-03-02 PROCEDURE — 84484 ASSAY OF TROPONIN QUANT: CPT

## 2023-03-02 PROCEDURE — 71260 CT THORAX DX C+: CPT | Performed by: PHYSICIAN ASSISTANT

## 2023-03-02 PROCEDURE — 2060000000 HC ICU INTERMEDIATE R&B

## 2023-03-02 RX ORDER — ATORVASTATIN CALCIUM 10 MG/1
1 TABLET, FILM COATED ORAL DAILY
COMMUNITY
Start: 2023-02-20

## 2023-03-02 RX ORDER — 0.9 % SODIUM CHLORIDE 0.9 %
1000 INTRAVENOUS SOLUTION INTRAVENOUS ONCE
Status: COMPLETED | OUTPATIENT
Start: 2023-03-02 | End: 2023-03-02

## 2023-03-02 RX ORDER — SODIUM CHLORIDE 0.9 % (FLUSH) 0.9 %
5-40 SYRINGE (ML) INJECTION EVERY 12 HOURS SCHEDULED
Status: DISCONTINUED | OUTPATIENT
Start: 2023-03-02 | End: 2023-03-08 | Stop reason: HOSPADM

## 2023-03-02 RX ORDER — ACETAMINOPHEN 650 MG/1
650 SUPPOSITORY RECTAL EVERY 6 HOURS PRN
Status: DISCONTINUED | OUTPATIENT
Start: 2023-03-02 | End: 2023-03-08 | Stop reason: HOSPADM

## 2023-03-02 RX ORDER — DIPHENHYDRAMINE HYDROCHLORIDE 25 MG/1
1 CAPSULE ORAL EVERY 6 HOURS PRN
COMMUNITY
Start: 2023-02-20

## 2023-03-02 RX ORDER — SULFAMETHOXAZOLE AND TRIMETHOPRIM 800; 160 MG/1; MG/1
1 TABLET ORAL NIGHTLY
Status: DISCONTINUED | OUTPATIENT
Start: 2023-03-02 | End: 2023-03-08 | Stop reason: HOSPADM

## 2023-03-02 RX ORDER — ONDANSETRON 2 MG/ML
4 INJECTION INTRAMUSCULAR; INTRAVENOUS EVERY 6 HOURS PRN
Status: DISCONTINUED | OUTPATIENT
Start: 2023-03-02 | End: 2023-03-08 | Stop reason: HOSPADM

## 2023-03-02 RX ORDER — ATORVASTATIN CALCIUM 10 MG/1
10 TABLET, FILM COATED ORAL DAILY
Status: DISCONTINUED | OUTPATIENT
Start: 2023-03-03 | End: 2023-03-08 | Stop reason: HOSPADM

## 2023-03-02 RX ORDER — ACETAMINOPHEN 325 MG/1
650 TABLET ORAL EVERY 6 HOURS PRN
Status: DISCONTINUED | OUTPATIENT
Start: 2023-03-02 | End: 2023-03-08 | Stop reason: HOSPADM

## 2023-03-02 RX ORDER — LANOLIN ALCOHOL/MO/W.PET/CERES
3 CREAM (GRAM) TOPICAL NIGHTLY PRN
Status: DISCONTINUED | OUTPATIENT
Start: 2023-03-02 | End: 2023-03-08 | Stop reason: HOSPADM

## 2023-03-02 RX ORDER — SODIUM CHLORIDE 9 MG/ML
INJECTION, SOLUTION INTRAVENOUS PRN
Status: DISCONTINUED | OUTPATIENT
Start: 2023-03-02 | End: 2023-03-08 | Stop reason: HOSPADM

## 2023-03-02 RX ORDER — ENOXAPARIN SODIUM 100 MG/ML
40 INJECTION SUBCUTANEOUS DAILY
Status: DISCONTINUED | OUTPATIENT
Start: 2023-03-03 | End: 2023-03-08 | Stop reason: HOSPADM

## 2023-03-02 RX ORDER — MELATONIN
1 DAILY
COMMUNITY
Start: 2023-02-21

## 2023-03-02 RX ORDER — BICTEGRAVIR SODIUM, EMTRICITABINE, AND TENOFOVIR ALAFENAMIDE FUMARATE 50; 200; 25 MG/1; MG/1; MG/1
1 TABLET ORAL DAILY
COMMUNITY
Start: 2023-02-21

## 2023-03-02 RX ORDER — POLYETHYLENE GLYCOL 3350 17 G/17G
17 POWDER, FOR SOLUTION ORAL DAILY PRN
Status: DISCONTINUED | OUTPATIENT
Start: 2023-03-02 | End: 2023-03-04

## 2023-03-02 RX ORDER — DIPHENHYDRAMINE HCL 25 MG
25 TABLET ORAL EVERY 6 HOURS PRN
Status: DISCONTINUED | OUTPATIENT
Start: 2023-03-02 | End: 2023-03-08 | Stop reason: HOSPADM

## 2023-03-02 RX ORDER — ONDANSETRON 4 MG/1
4 TABLET, ORALLY DISINTEGRATING ORAL EVERY 8 HOURS PRN
Status: DISCONTINUED | OUTPATIENT
Start: 2023-03-02 | End: 2023-03-08 | Stop reason: HOSPADM

## 2023-03-02 RX ORDER — VITAMIN B COMPLEX
1000 TABLET ORAL DAILY
Status: DISCONTINUED | OUTPATIENT
Start: 2023-03-03 | End: 2023-03-08 | Stop reason: HOSPADM

## 2023-03-02 RX ORDER — SODIUM CHLORIDE 0.9 % (FLUSH) 0.9 %
5-40 SYRINGE (ML) INJECTION PRN
Status: DISCONTINUED | OUTPATIENT
Start: 2023-03-02 | End: 2023-03-08 | Stop reason: HOSPADM

## 2023-03-02 RX ORDER — SULFAMETHOXAZOLE AND TRIMETHOPRIM 800; 160 MG/1; MG/1
1 TABLET ORAL DAILY
COMMUNITY
Start: 2023-02-21

## 2023-03-02 RX ADMIN — SODIUM CHLORIDE 1000 ML: 9 INJECTION, SOLUTION INTRAVENOUS at 19:08

## 2023-03-02 RX ADMIN — Medication 3 MG: at 23:50

## 2023-03-02 RX ADMIN — IOPAMIDOL 75 ML: 755 INJECTION, SOLUTION INTRAVENOUS at 21:09

## 2023-03-02 RX ADMIN — SULFAMETHOXAZOLE AND TRIMETHOPRIM 1 TABLET: 800; 160 TABLET ORAL at 23:34

## 2023-03-02 ASSESSMENT — PAIN - FUNCTIONAL ASSESSMENT
PAIN_FUNCTIONAL_ASSESSMENT: NONE - DENIES PAIN
PAIN_FUNCTIONAL_ASSESSMENT: NONE - DENIES PAIN

## 2023-03-02 ASSESSMENT — ENCOUNTER SYMPTOMS
EYE PAIN: 0
VOMITING: 0
BACK PAIN: 0
SHORTNESS OF BREATH: 0
SORE THROAT: 0
NAUSEA: 0
ABDOMINAL PAIN: 0
COUGH: 0

## 2023-03-03 ENCOUNTER — APPOINTMENT (OUTPATIENT)
Dept: MRI IMAGING | Age: 58
DRG: 240 | End: 2023-03-03
Payer: MEDICAID

## 2023-03-03 LAB
ANION GAP SERPL CALCULATED.3IONS-SCNC: 14 MMOL/L (ref 3–16)
BASOPHILS ABSOLUTE: 0 K/UL (ref 0–0.2)
BASOPHILS RELATIVE PERCENT: 0.3 %
BUN BLDV-MCNC: 5 MG/DL (ref 7–20)
CALCIUM SERPL-MCNC: 8.4 MG/DL (ref 8.3–10.6)
CEA: ABNORMAL NG/ML (ref 0–5)
CHLORIDE BLD-SCNC: 98 MMOL/L (ref 99–110)
CO2: 25 MMOL/L (ref 21–32)
CREAT SERPL-MCNC: 0.8 MG/DL (ref 0.6–1.1)
EOSINOPHILS ABSOLUTE: 0.1 K/UL (ref 0–0.6)
EOSINOPHILS RELATIVE PERCENT: 0.5 %
FERRITIN: 287.4 NG/ML (ref 15–150)
GFR SERPL CREATININE-BSD FRML MDRD: >60 ML/MIN/{1.73_M2}
GLUCOSE BLD-MCNC: 104 MG/DL (ref 70–99)
HAV IGM SER IA-ACNC: NORMAL
HCT VFR BLD CALC: 27.2 % (ref 36–48)
HEMOGLOBIN: 9 G/DL (ref 12–16)
HEPATITIS B CORE IGM ANTIBODY: NORMAL
HEPATITIS B SURFACE ANTIGEN INTERPRETATION: NORMAL
HEPATITIS C ANTIBODY INTERPRETATION: NORMAL
IRON SATURATION: 14 % (ref 15–50)
IRON: 29 UG/DL (ref 37–145)
LV EF: 58 %
LVEF MODALITY: NORMAL
LYMPHOCYTES ABSOLUTE: 1.5 K/UL (ref 1–5.1)
LYMPHOCYTES RELATIVE PERCENT: 14.2 %
MAGNESIUM: 2.1 MG/DL (ref 1.8–2.4)
MCH RBC QN AUTO: 26 PG (ref 26–34)
MCHC RBC AUTO-ENTMCNC: 33.1 G/DL (ref 31–36)
MCV RBC AUTO: 78.6 FL (ref 80–100)
MONOCYTES ABSOLUTE: 0.6 K/UL (ref 0–1.3)
MONOCYTES RELATIVE PERCENT: 6.1 %
NEUTROPHILS ABSOLUTE: 8.3 K/UL (ref 1.7–7.7)
NEUTROPHILS RELATIVE PERCENT: 78.9 %
PDW BLD-RTO: 21.5 % (ref 12.4–15.4)
PLATELET # BLD: 433 K/UL (ref 135–450)
PMV BLD AUTO: 7.8 FL (ref 5–10.5)
POTASSIUM REFLEX MAGNESIUM: 2.8 MMOL/L (ref 3.5–5.1)
RBC # BLD: 3.46 M/UL (ref 4–5.2)
SODIUM BLD-SCNC: 137 MMOL/L (ref 136–145)
TOTAL IRON BINDING CAPACITY: 203 UG/DL (ref 260–445)
WBC # BLD: 10.6 K/UL (ref 4–11)

## 2023-03-03 PROCEDURE — 2580000003 HC RX 258: Performed by: PEDIATRICS

## 2023-03-03 PROCEDURE — 6370000000 HC RX 637 (ALT 250 FOR IP): Performed by: FAMILY MEDICINE

## 2023-03-03 PROCEDURE — 93306 TTE W/DOPPLER COMPLETE: CPT

## 2023-03-03 PROCEDURE — 80048 BASIC METABOLIC PNL TOTAL CA: CPT

## 2023-03-03 PROCEDURE — 83735 ASSAY OF MAGNESIUM: CPT

## 2023-03-03 PROCEDURE — 83540 ASSAY OF IRON: CPT

## 2023-03-03 PROCEDURE — 86301 IMMUNOASSAY TUMOR CA 19-9: CPT

## 2023-03-03 PROCEDURE — A9577 INJ MULTIHANCE: HCPCS | Performed by: PEDIATRICS

## 2023-03-03 PROCEDURE — 80074 ACUTE HEPATITIS PANEL: CPT

## 2023-03-03 PROCEDURE — 74183 MRI ABD W/O CNTR FLWD CNTR: CPT

## 2023-03-03 PROCEDURE — 36415 COLL VENOUS BLD VENIPUNCTURE: CPT

## 2023-03-03 PROCEDURE — 6370000000 HC RX 637 (ALT 250 FOR IP): Performed by: PEDIATRICS

## 2023-03-03 PROCEDURE — 82728 ASSAY OF FERRITIN: CPT

## 2023-03-03 PROCEDURE — 94760 N-INVAS EAR/PLS OXIMETRY 1: CPT

## 2023-03-03 PROCEDURE — 83550 IRON BINDING TEST: CPT

## 2023-03-03 PROCEDURE — 82105 ALPHA-FETOPROTEIN SERUM: CPT

## 2023-03-03 PROCEDURE — 85025 COMPLETE CBC W/AUTO DIFF WBC: CPT

## 2023-03-03 PROCEDURE — 82378 CARCINOEMBRYONIC ANTIGEN: CPT

## 2023-03-03 PROCEDURE — 6360000004 HC RX CONTRAST MEDICATION: Performed by: PEDIATRICS

## 2023-03-03 PROCEDURE — 2060000000 HC ICU INTERMEDIATE R&B

## 2023-03-03 RX ORDER — POTASSIUM CHLORIDE 750 MG/1
40 TABLET, FILM COATED, EXTENDED RELEASE ORAL ONCE
Status: DISCONTINUED | OUTPATIENT
Start: 2023-03-03 | End: 2023-03-08 | Stop reason: HOSPADM

## 2023-03-03 RX ORDER — LORAZEPAM 0.5 MG/1
0.5 TABLET ORAL
Status: COMPLETED | OUTPATIENT
Start: 2023-03-03 | End: 2023-03-03

## 2023-03-03 RX ORDER — OXYCODONE HYDROCHLORIDE 5 MG/1
5 TABLET ORAL EVERY 4 HOURS PRN
Status: DISCONTINUED | OUTPATIENT
Start: 2023-03-03 | End: 2023-03-08 | Stop reason: HOSPADM

## 2023-03-03 RX ORDER — POTASSIUM CHLORIDE 20 MEQ/1
20 TABLET, EXTENDED RELEASE ORAL ONCE
Status: COMPLETED | OUTPATIENT
Start: 2023-03-03 | End: 2023-03-03

## 2023-03-03 RX ADMIN — GADOBENATE DIMEGLUMINE 15 ML: 529 INJECTION, SOLUTION INTRAVENOUS at 13:10

## 2023-03-03 RX ADMIN — Medication 10 ML: at 11:18

## 2023-03-03 RX ADMIN — SULFAMETHOXAZOLE AND TRIMETHOPRIM 1 TABLET: 800; 160 TABLET ORAL at 20:13

## 2023-03-03 RX ADMIN — Medication 10 ML: at 20:13

## 2023-03-03 RX ADMIN — LORAZEPAM 0.5 MG: 0.5 TABLET ORAL at 11:53

## 2023-03-03 RX ADMIN — POTASSIUM CHLORIDE 20 MEQ: 20 TABLET, EXTENDED RELEASE ORAL at 06:52

## 2023-03-03 ASSESSMENT — PAIN SCALES - GENERAL
PAINLEVEL_OUTOF10: 0

## 2023-03-03 NOTE — ADT AUTH CERT
Subscriber Details  Hospital Account [de-identified]  [facility] Subscriber Name/Sex/Relation Subscriber  Subscriber Address/Phone Subscriber Emp/Emp Phone   3. [address]   [phone] [facility] - Female   (Self) 1965 [place] [facility], [address]   [phone](K) UNKNOWN      Utilization Reviews       PA RECOMMENDS INPATIENT by Eliezer Chambers RN       Review Status Review Entered   In Primary 3/3/2023 8632       Created By   Eliezer Chambers RN      Criteria Review   We recommend that the following pt's current hospitalization under Inpatient status is APPROPRIATE .      Name: Althea Hernandez   : 1965   CSN: Vicky Morris 51 [place] [employer]      Clinical summary     Dyspnea, in patient with HIV:   - check echo to exclude pericardial effusion   - presenting TNI negative at <0.01   - telemetry   - MRI liver ordered      Multiple liver masses:   - noted on CT scan 3/2/23   - check liver MRI  - AFP, CA 19-9 with AM labs     Vitals stable  Labs and Imaging reviewed  Status decision based on clinical judgment and Commercial Utilization criteria, e.g., [employer], [place]   Comments due to medical necessity, this patient is appropriate for IP     This chart was reviewed at 3:42 PM 3/3/2023    [address] [place] [address] : [phone]        Pulmonary Disease [address] [place] [address] - [facility] Day 2 (3/3/2023) by Eliezer Chambers RN       Review Status Review Entered   Completed 3/3/2023 3780       Created By   Eliezer Chambers RN      Criteria Review      Care Day: 2 Care Date: 3/3/2023 Level of Care: Inpatient Floor    Guideline Day 2    Clinical Status    ( ) * No ICU or intermediate care needs    ( ) * No mechanical ventilation required    * Milestone   Additional Notes   DATE: 2023      RELEVANT BASELINES: RA      PERTINENT UPDATES:Remains tachy      VITALS:98.3-855-/81-97% on RA      ABNL/PERTINENT LABS:wbc 10.6   Hgb 9.0, hct 27.2   K+2.8   Cl 98         RADIOLOGY/DIAGNOSTIC STUDIES:MRI Abd-Widespread hepatic metastasis. Suspected wall thickening of the colon in the region of the splenic flexure. Consider colonoscopy to rule out colonic mass as a cause of the hepatic metastasis       PHYSICAL EXAM:Lungs: clear to auscultation bilaterally, Normal Effort   Heart: regular rate and rhythm, normal S1 and S2, no murmurs or rubs   Abdomen: soft, non-distended, non-tender. Bowel sounds normal.      Per GI-IMPRESSION:   1. Multiple liver lesions concerning for metastatic disease. IR guided biopsy ordered. AFP and CA 19-9 ordered   2. Thickening of the splenic flexure. Concern for primary malignancy. No prior colonoscopy. Has a family history of colon cancer in her brother. 3. History of HIV on Biktarvy. Follows with ID at            RECOMMENDATIONS:     Follow-up on IR guided liver biopsy   Follow-up on AFP and CA 19-9. Will add CEA         MEDICATIONS:Klor-con 20 meq x1    Bactrim DS 1 tab 800-160 mg po nightly    Ativan 0.5 mg x1 po            Pulmonary Disease GRG - Care Day 1 (3/2/2023) by Lucero Suh RN       Review Status Review Entered   Completed 3/3/2023 1528       Created By   Lucero Suh RN      Criteria Review      Care Day: 1 Care Date: 3/2/2023 Level of Care: Inpatient Floor    Guideline Day 1    Clinical Status    (X) * Clinical Indications met    * Milestone   Additional Notes   DATE: 03/02/2023      ED PRESENTATION:Chief Complaint of shortness of breath off and on for the last month   does have a history HIV. RELEVANT BASELINES: RA      VITALS:98.0-386-/93-98% on RA      ABNL/PERTINENT LABS:hgb 9.7, hct 29.9   Probnp 163         RADIOLOGY/DIAGNOSTIC STUDIES:CXR-No radiographic evidence of acute pulmonary disease      CT Chest-No evidence of pulmonary embolism or acute pulmonary abnormality. 2.  Multiple liver masses.        3.  Small hiatus hernia   ED TREATMENT:IVF bolus 0.9 NS 1000 ml x1      ADMISSION DIAGNOSIS:DYSPNEA      PERTINENT MEDICAL HISTORY:AIDS      PHYSICAL EXAM:General appearance:  No apparent distress, appears stated age and cooperative. HEENT:  Normal cephalic, atraumatic without obvious deformity. Pupils equal, round, and reactive to light. Extra ocular muscles intact. Conjunctivae/corneas clear. Neck: Supple, with full range of motion. No jugular venous distention. Trachea midline. Respiratory:  Normal respiratory effort. Clear to auscultation, bilaterally without Rales/Wheezes/Rhonchi. Cardiovascular:  Regular rate and rhythm with normal S1/S2 without murmurs, rubs or gallops. Abdomen: Soft, non-tender, non-distended with normal bowel sounds. Musculoskeletal:  No clubbing, cyanosis or edema bilaterally. Full range of motion without deformity. Skin: Skin color, texture, turgor normal.  No rashes or lesions. Neurologic:      Speech clear    No facial droop   Moves ext x4    Psychiatric:  Alert and oriented,    Capillary Refill: Brisk,3 seconds, normal   Peripheral Pulses: +2 palpable, equal bilaterally       H&P      65 y.o. female who presented to [facility] with hx of HIV presents with dyspnea. She reports new onset of dyspnea and fatigue with walking towards the end of the day for the past 1 week. She reports getting short winded typically about 7-8 PM - and tends to happen while at rest.      She reports previously weighing about >200 lbs in 2022.     Weight on presentation 160       Dyspnea, in patient with HIV:    - check echo to exclude pericardial effusion    - presenting TNI negative at <0.01    - telemetry    - MRI liver ordered        Multiple liver masses:    - noted on CT scan 3/2/23    - check liver MRI   - AFP, CA 19-9 with AM labs    - I am concerned that this is malignancy        Abnormal LFTs:    - check hepatitis panel        Diminished breath sounds on exam:    - suspect she has some level of emphysema and would benefit from outpatient PFT / follow up         HIV:    - cont biktarvy -25    - bactrim po daily        Wheezing:    - albuterol neg q4 hrs prn        Dyslipidemia:    - cont statin       Supplement:    - cont vit D3 po daily            DVT Prophylaxis: enoxaparin          MEDICATIONS:Bactrim DS 1 tab po nightly            Pulmonary Disease GRG - Clinical Indications for Admission to Inpatient Care by Cindy Schwartz RN       Review Status Review Entered   Completed 3/3/2023 1528       Created By   Cindy Schwartz RN      Criteria Review      Clinical Indications for Admission to Inpatient Care    Most Recent : Cindy Schwartz Most Recent Date: 3/3/2023 3:28 PM EST    (X) Hospital admission is needed for appropriate care of the patient because of  1 or more  of    the following  (1) (2) (3):       (X) Pulmonary Disease condition, symptom, or finding for which observation care has failed or       is not considered appropriate. See General Criteria: Observation Care, General Admission       Criteria, or Pediatric General Admission Criteria guideline as appropriate.       3/3/2023 3:28 PM EST by Cindy Schwartz         Presistent Dyspnea for past month--Hx of HIV

## 2023-03-03 NOTE — ED PROVIDER NOTES
**ADVANCED PRACTICE PROVIDER, I HAVE EVALUATED THIS PATIENT**        [address]      Pt Name: Hernandez Armas  BAK:[id number]  [place] 1965  Date of evaluation: 3/2/2023  Provider: Alana Boland PA-C  Note Started: 8:11 PM EST 3/2/2023        Chief Complaint:    Chief Complaint   Patient presents with    Shortness of Breath     Pt arrives with c/o SOB at night for about a month. Pt denies chest pain at this time. Pt denies any cardiac hx. Nursing Notes, Past Medical Hx, Past Surgical Hx, Social Hx, Allergies, and Family Hx were all reviewed and agreed with or any disagreements were addressed in the HPI.    HPI: (Location, Duration, Timing, Severity, Quality, Assoc Sx, Context, Modifying factors)    History From: Patient      Chief Complaint of shortness of breath off and on for the last month. She denies cough, no fever, no chest pain, states she has no history of CHF, she denies smoke use. No history of COPD or asthma. Denies leg swelling. No cardiac history. She does have a history HIV. No other complaints. This is a  65 y.o. female who presents to the emergency room with the above complaint    PastMedical/Surgical History:      Diagnosis Date    AIDS (acquired immune deficiency syndrome) ([address]      History reviewed. No pertinent surgical history.     Medications:  Previous Medications    ATORVASTATIN (LIPITOR) 10 MG TABLET    Take 1 tablet by mouth daily    BANOPHEN 25 MG CAPSULE    Take 1 capsule by mouth every 6 hours as needed for Itching    BIKTARVY -25 MG TABS PER TABLET    Take 1 tablet by mouth daily    SULFAMETHOXAZOLE-TRIMETHOPRIM (BACTRIM DS;SEPTRA DS) 800-160 MG PER TABLET    Take 1 tablet by mouth daily    VENTOLIN  (90 BASE) MCG/ACT INHALER    Inhale 2 puffs into the lungs every 6 hours as needed for Wheezing    VITAMIN D3 (CHOLECALCIFEROL) 25 MCG (1000 UT) TABS TABLET    Take 1 tablet by mouth daily       Review of Systems:  (1 systems needed)  Review of Systems   Constitutional:  Negative for chills and fever. HENT:  Negative for congestion and sore throat. Eyes:  Negative for pain and visual disturbance. Respiratory:  Negative for cough and shortness of breath. Cardiovascular:  Negative for chest pain and leg swelling. Gastrointestinal:  Negative for abdominal pain, nausea and vomiting. Genitourinary:  Negative for dysuria and frequency. Musculoskeletal:  Negative for back pain and neck pain. Skin:  Negative for rash and wound. Neurological:  Negative for dizziness and light-headedness.      \"Positives and Pertinent negatives as per HPI\"    Physical Exam:  Physical Exam    MEDICAL DECISION MAKING    Vitals:    Vitals:    03/02/23 1600 03/02/23 1620 03/02/23 1700 03/02/23 1900   BP: (!) 119/92  (!) 139/93 (!) 116/97   Pulse: (!) 107 (!) 102 100 (!) 106   Resp: 24 (!) 32 21 15   Temp:       TempSrc:       SpO2: 100% 99% 100% 99%   Weight:       Height:           LABS:  Labs Reviewed   CBC WITH AUTO DIFFERENTIAL - Abnormal; Notable for the following components:       Result Value    RBC 3.74 (*)     Hemoglobin 9.7 (*)     Hematocrit 29.9 (*)     MCH 25.9 (*)     RDW 21.6 (*)     Platelets 295 (*)     Neutrophils Absolute 8.1 (*)     All other components within normal limits   BASIC METABOLIC PANEL W/ REFLEX TO MG FOR LOW K - Abnormal; Notable for the following components:    Glucose 116 (*)     All other components within normal limits   BRAIN NATRIURETIC PEPTIDE - Abnormal; Notable for the following components:    Pro- (*)     All other components within normal limits   TROPONIN   BLOOD OCCULT STOOL DIAGNOSTIC    Narrative:     ORDER#: [id number]                          ORDERED BY: ARLET EUCEDA  SOURCE: Stool                              COLLECTED:  03/02/23 18:30  ANTIBIOTICS AT LIEN.:                      RECEIVED :  03/02/23 19:00        Remainder of labs reviewed and were negative at this time or not returned at the time of this note. RADIOLOGY:   Non-plain film images such as CT, Ultrasound and MRI are read by the radiologist. Aparna Harvey PA-C have directly visualized the radiologic plain film image(s) with the below findings:      Interpretation per the Radiologist below, if available at the time of this note:    XR CHEST (2 VW)   Final Result   No radiographic evidence of acute pulmonary disease. CT CHEST PULMONARY EMBOLISM W CONTRAST    (Results Pending)     XR CHEST (2 VW)    Result Date: 3/2/2023  EXAMINATION: TWO XRAY VIEWS OF THE CHEST 3/2/2023 3:07 pm COMPARISON: Chest x-ray dated 01/13/2023. HISTORY: ORDERING SYSTEM PROVIDED HISTORY: Shortness of breath TECHNOLOGIST PROVIDED HISTORY: Reason for exam:->Shortness of breath Reason for Exam: Shortness of breath FINDINGS: HEART/MEDIASTINUM: The cardiomediastinal silhouette is within normal limits. PLEURA/LUNGS: There are no focal consolidations or pleural effusions. There is no appreciable pneumothorax. BONES/SOFT TISSUE: No acute abnormality. No radiographic evidence of acute pulmonary disease. No results found. MEDICAL DECISION MAKING / ED COURSE:      PROCEDURES:   Procedures    None    Patient was given:  Medications   0.9 % sodium chloride bolus (0 mLs IntraVENous Stopped 3/2/23 2059)   iopamidol (ISOVUE-370) 76 % injection 75 mL (75 mLs IntraVENous Given 3/2/23 2109)       CONSULTS: (Who and What was discussed)  None          Chronic Conditions affecting care:    has a past medical history of AIDS (acquired immune deficiency syndrome) ([facility] [address] Records Reviewed (External and Source)   I personally reviewed patient medical record    CC/HPI Summary, DDx, ED Course, and Reassessment:     Emergency room course: See physical exam and HPI above. Patient lungs are clear. No wheeze rales or rhonchi. No chest wall tenderness to palpation. Abdomen is soft nontender.   Bilateral extremities show no edema.  She does not appear to be in acute distress. Alert oriented x4. CBC white count of 10.4, RBC 3.74, hemoglobin 9.7, hematocrit 29.9. Hemoglobin and hematocrit has dropped somewhat. This could be the source of her shortness of breath due to anemia. Not to the point where she needs transfusion. BMP unremarkable.   Troponin less than 0.01    Chest x-ray shows no acute cardiopulmonary abnormality. Discussed patient chest x-ray and lab result from today with her. As I was talking to her she is still become very tachypneic with respirations will go anywhere from 27-39. Heart rate anywhere from 10 5-1 20s. She was given a liter normal saline. And she still remains tachypneic and tachycardia. She denies any chest pain still. But she does states she feels short of breath. At this point I did discuss with patient when to go ahead and get her admitted for shortness of breath and tachypnea. She is not requiring oxygen. O2 saturation is above 95% on room air. Discussed admission with her and she is okay with that. I will place a consult out to hospitalist service for admission. While she is waiting for admission I went ahead and placed an order for CT chest rule out PE as well. Results are pending. Disposition Considerations (Tests not ordered but considered, Shared Decision Making, Pt Expectation of Test or Tx.):     See discussion above. Differential diagnosis would include PE, sinus tachycardia, SVT, CHF, COPD, asthma, emphysema, hypothyroidism, anemia     The patient tolerated their visit well. I evaluated the patient. The physician was available for consultation as needed. The patient and / or the family were informed of the results of any tests, a time was given to answer questions, a plan was proposed and they agreed with plan. I am the Primary Clinician of Record. CLINICAL IMPRESSION:  1. Shortness of breath    2. Tachypnea    3. Tachycardia    4.  Anemia, unspecified type        DISPOSITION Decision To Admit 03/02/2023 08:13:49 PM      PATIENT REFERRED TO:  No follow-up provider specified.     DISCHARGE MEDICATIONS:  New Prescriptions    No medications on file       DISCONTINUED MEDICATIONS:  Discontinued Medications    ACETAMINOPHEN (APAP EXTRA STRENGTH) 500 MG TABLET    Take 1 tablet by mouth every 6 hours as needed for Pain              (Please note the MDM and HPI sections of this note were completed with a voice recognition program.  Efforts were made to edit the dictations but occasionally words are mis-transcribed.)    Electronically signed, Dominique Rodriguez PA-C,          Dominique Rodriguez PA-C  03/02/23 2674

## 2023-03-03 NOTE — ED NOTES
Report called to [facility]     Lisbeth Chris RN  03/02/23 0541

## 2023-03-03 NOTE — H&P
Hospital Medicine History & Physical      PCP: Brett Carter, XIN - TEJ    Date of Admission: 3/2/2023    Date of Service: Pt seen/examined on 03/02/23 and Admitted to Inpatient with expected LOS greater than two midnights due to medical therapy. Chief Complaint:  dyspnea. History Of Present Illness:      65 y.o. female who presented to [facility] with hx of HIV presents with dyspnea. She reports new onset of dyspnea and fatigue with walking towards the end of the day for the past 1 week. She reports getting short winded typically about 7-8 PM - and tends to happen while at rest.     No leg swelling. Has hx of HIV - she thinks for > 20 years. She reports she isn't sure how she got it. Managed over at [address]. Denies hx of IVDA. She reports medication compliance. She reports not having a PCP. No prior colonoscopy. Has hx of prior mammogram in [facility] but it was years ago. She reports trips to [place] x2 in past year to help take care of family. She reports previously weighing about >200 lbs in 2022. Weight on presentation 160. Denies abdominal bloating.      ROS:   - no fevers   - no headache   Vision  ok   Hearing ok   No rhinitis   No sore throat   Denies chest pain   Denies heart racing or palpitations   Denies feeling LH/dizzy   + dyspnea   Denies wheezing   Reports cough - dry, started in  ED 3/2/23   No nausea  No vomiting   No abdominal pain  No diarrhea   Reports being constipated   No blood in stool   No dysuria   Denies skin rash / lesion   Denies muscle or joint pain   Denies anxiety   Denies panic attacks   Denies bleeding     SocHx:   - does not smoke, quit smoking >15 years ago   - previously smoked for > 20 years and > 1-2 ppd   - quit alcohol   -    - has 5 children   - 20 grandchildren   - 1 great grandchild       Past Medical History:          Diagnosis Date    AIDS (acquired immune deficiency syndrome) ([address]        Past Surgical History:      History reviewed. No pertinent surgical history. Medications Prior to Admission:      Prior to Admission medications    Medication Sig Start Date End Date Taking? Authorizing Provider   VENTOLIN  (90 Base) MCG/ACT inhaler Inhale 2 puffs into the lungs every 6 hours as needed for Wheezing 2/21/23   Historical Provider, MD   atorvastatin (LIPITOR) 10 MG tablet Take 1 tablet by mouth daily 2/20/23   Historical Provider, MD   BIKTARVY -25 MG TABS per tablet Take 1 tablet by mouth daily 2/21/23   Historical Provider, MD   vitamin D3 (CHOLECALCIFEROL) 25 MCG (1000 UT) TABS tablet Take 1 tablet by mouth daily 2/21/23   Historical Provider, MD   BANOPHEN 25 MG capsule Take 1 capsule by mouth every 6 hours as needed for Itching 2/20/23   Historical Provider, MD   sulfamethoxazole-trimethoprim (BACTRIM DS;SEPTRA DS) 800-160 MG per tablet Take 1 tablet by mouth daily 2/21/23   Historical Provider, MD       Allergies:  Patient has no known allergies. Social History:      The patient currently lives at home     TOBACCO:   reports that she has never smoked. She has never used smokeless tobacco.  ETOH:   reports that she does not currently use alcohol. E-cigarette/Vaping       Questions Responses    E-cigarette/Vaping Use     Start Date     Passive Exposure     Quit Date     Counseling Given     Comments               Family History:           History reviewed. No pertinent family history. REVIEW OF SYSTEMS COMPLETED:   Pertinent positives as noted in the HPI. All other systems reviewed and negative. PHYSICAL EXAM PERFORMED:    BP (!) 116/97   Pulse (!) 106   Temp 98.1 °F (36.7 °C) (Oral)   Resp 15   Ht 5' 4\" (1.626 m)   Wt 160 lb 4.4 oz (72.7 kg)   SpO2 99%   BMI 27.51 kg/m²     General appearance:  No apparent distress, appears stated age and cooperative. HEENT:  Normal cephalic, atraumatic without obvious deformity. Pupils equal, round, and reactive to light.   Extra ocular muscles intact. Conjunctivae/corneas clear. Neck: Supple, with full range of motion. No jugular venous distention. Trachea midline. Respiratory:  Normal respiratory effort. Clear to auscultation, bilaterally without Rales/Wheezes/Rhonchi. Cardiovascular:  Regular rate and rhythm with normal S1/S2 without murmurs, rubs or gallops. Abdomen: Soft, non-tender, non-distended with normal bowel sounds. Musculoskeletal:  No clubbing, cyanosis or edema bilaterally. Full range of motion without deformity. Skin: Skin color, texture, turgor normal.  No rashes or lesions. Neurologic:     Speech clear   No facial droop  Moves ext x4   Psychiatric:  Alert and oriented,   Capillary Refill: Brisk,3 seconds, normal  Peripheral Pulses: +2 palpable, equal bilaterally       Labs:     Recent Labs     03/02/23  1457   WBC 10.4   HGB 9.7*   HCT 29.9*   *     Recent Labs     03/02/23  1457      K 4.6      CO2 25   BUN 7   CREATININE 0.9   CALCIUM 8.7     No results for input(s): AST, ALT, BILIDIR, BILITOT, ALKPHOS in the last 72 hours. No results for input(s): INR in the last 72 hours. Recent Labs     03/02/23  1457   TROPONINI <0.01       Urinalysis:    No results found for: Tiffany Saba, BACTERIA, RBCUA, BLOODU, [address], [address]    Radiology:     CXR: I have reviewed the CXR with the following interpretation:   EKG:  I have reviewed the EKG with the following interpretation: sinus tachycardia, ? ME abnormality   - but read as ST/T wave abnormality       XR CHEST (2 VW)   Final Result   No radiographic evidence of acute pulmonary disease.          CT CHEST PULMONARY EMBOLISM W CONTRAST    (Results Pending)       Consults:    None    ASSESSMENT:    Active Hospital Problems    Diagnosis Date Noted    Dyspnea [R06.00] 03/02/2023     Priority: Andriy Camp is a 65 y.o. female     Dyspnea, in patient with HIV:   - check echo to exclude pericardial effusion   - presenting TNI negative at <0.01   - telemetry   - MRI liver ordered     Multiple liver masses:   - noted on CT scan 3/2/23   - check liver MRI  - AFP, CA 19-9 with AM labs   - I am concerned that this is malignancy     Abnormal LFTs:   - check hepatitis panel     Diminished breath sounds on exam:   - suspect she has some level of emphysema and would benefit from outpatient PFT / follow up      HIV:   - cont biktarvy -25   - bactrim po daily     Wheezing:   - albuterol neg q4 hrs prn     Dyslipidemia:   - cont statin    Supplement:   - cont vit D3 po daily       DVT Prophylaxis: enoxaparin   Diet: No diet orders on file  Code Status: - FULL CODE   - alternative decision maker - daughter Prashant Mohamud     PT/OT Eval Status: not consulted     Dispo - pending improvement / workup        Domi Melendez MD    Thank you Harry Rowe. XIN Carter - TEJ for the opportunity to be involved in this patient's care. References:   [email or web address]      https://pubmed.ncbi.nlm.nih.gov/80536528/

## 2023-03-03 NOTE — CONSULTS
GASTROENTEROLOGY INPATIENT CONSULTATION        IDENTIFYING DATA/REASON FOR CONSULTATION   PATIENT:  Hernandez Armas  MRN:  [id number]  ADMIT DATE: 3/2/2023  TIME OF EVALUATION: 3/3/2023 2:45 PM  HOSPITAL STAY:   LOS: 1 day     REASON FOR CONSULTATION:  liver masses, concern for colon primary     HISTORY OF PRESENT ILLNESS   Hernandez Armas is a 65 y.o. female with a PMH of HIV who presented on 3/2/2023 with dyspnea. CT chest showed multiple liver masses. Follow up MRI abd showed innumerable masses throughout the liver, in both the right and left hepatic lobes, which enhance on postcontrast images, most likely metastasis. Incompletely evaluated is questionable wall thickening of the colon in the region of the splenic flexure. IR liver biopsy ordered and pending. Patient has never had a colonoscopy procedure. She reports her brother  of colon cancer but is not sure what age she was diagnosed. She reports 30 to 40 pound weight loss over the past 1 to 2 months. She denies abdominal pain, nausea, vomiting. She feels constipated but is passing bowel movements. Last BM was earlier today. She denies any recent melena or hematochezia. PAST MEDICAL, SURGICAL, FAMILY, and SOCIAL HISTORY     Past Medical History:   Diagnosis Date    AIDS (acquired immune deficiency syndrome) ([facility] [address]      History reviewed. No pertinent surgical history. History reviewed. No pertinent family history.   Social History     Socioeconomic History    Marital status:      Spouse name: None    Number of children: None    Years of education: None    Highest education level: None   Tobacco Use    Smoking status: Never    Smokeless tobacco: Never   Substance and Sexual Activity    Alcohol use: Not Currently    Drug use: Not Currently       MEDICATIONS   SCHEDULED:  potassium chloride, 40 mEq, Once  bictegravir-emtricitab-tenofovir alafenamide, 1 tablet, Daily  sulfamethoxazole-trimethoprim, 1 tablet, Nightly  Vitamin D, 1,000 Units, Daily  atorvastatin, 10 mg, Daily  sodium chloride flush, 5-40 mL, 2 times per day  enoxaparin, 40 mg, Daily      FLUIDS/DRIPS:     sodium chloride       PRNs: oxyCODONE, 5 mg, Q4H PRN  diphenhydrAMINE, 25 mg, Q6H PRN  sodium chloride flush, 5-40 mL, PRN  sodium chloride, , PRN  ondansetron, 4 mg, Q8H PRN   Or  ondansetron, 4 mg, Q6H PRN  polyethylene glycol, 17 g, Daily PRN  acetaminophen, 650 mg, Q6H PRN   Or  acetaminophen, 650 mg, Q6H PRN  melatonin, 3 mg, Nightly PRN      ALLERGIES:  She No Known Allergies    REVIEW OF SYSTEMS   Pertinent ROS noted in HPI    PHYSICAL EXAM     Vitals:    03/03/23 0200 03/03/23 0344 03/03/23 0708 03/03/23 0844   BP:  122/79 138/81    Pulse:  (!) 106 (!) 105    Resp:  15 16 16   Temp:  98.3 °F (36.8 °C) 98.8 °F (37.1 °C)    TempSrc:  Oral Oral    SpO2:  97% 98% 97%   Weight: 160 lb 7.9 oz (72.8 kg)      Height: 5' 4\" (1.626 m)          No intake/output data recorded. Physical Exam:  General appearance: alert, cooperative, no distress, appears stated age  Eyes: Anicteric  Head: Normocephalic, without obvious abnormality  Lungs: clear to auscultation bilaterally, Normal Effort  Heart: regular rate and rhythm, normal S1 and S2, no murmurs or rubs  Abdomen: soft, non-distended, non-tender. Bowel sounds normal. No masses,  no organomegaly. Extremities: atraumatic, no cyanosis or edema  Skin: warm and dry, no jaundice  Neuro: Grossly intact, A&OX3      LABS AND IMAGING   Laboratory   Recent Labs     03/02/23  1457 03/03/23  0505   WBC 10.4 10.6   HGB 9.7* 9.0*   HCT 29.9* 27.2*   MCV 80.2 78.6*   * 433     Recent Labs     03/02/23  1457 03/03/23  0505    137   K 4.6 2.8*    98*   CO2 25 25   BUN 7 5*   CREATININE 0.9 0.8     No results for input(s): AST, ALT, ALB, BILIDIR, BILITOT, ALKPHOS in the last 72 hours. No results for input(s): LIPASE, AMYLASE in the last 72 hours. No results for input(s): PROTIME, INR in the last 72 hours.     Imaging  MRI ABDOMEN W WO CONTRAST   Final Result   Widespread hepatic metastasis. Suspected wall thickening of the colon in the region of the splenic flexure. Consider colonoscopy to rule out colonic mass as a cause of the hepatic   metastasis         CT CHEST PULMONARY EMBOLISM W CONTRAST   Final Result   1. No evidence of pulmonary embolism or acute pulmonary abnormality. 2.  Multiple liver masses. 3.  Small hiatus hernia. XR CHEST (2 VW)   Final Result   No radiographic evidence of acute pulmonary disease. IR BIOPSY LIVER PERCUTANEOUS    (Results Pending)         ASSESSMENT AND RECOMMENDATIONS   65 y.o. female with a PMH of HIV who presented on 3/2/2023 with dyspnea. CT chest showed multiple liver masses. Follow up MRI abd showed innumerable masses throughout the liver, in both the right and left hepatic lobes, which enhance on postcontrast images, most likely metastasis. Incompletely evaluated is questionable wall thickening of the colon in the region of the splenic flexure. IMPRESSION:  Multiple liver lesions concerning for metastatic disease. IR guided biopsy ordered. AFP and CA 19-9 ordered  Thickening of the splenic flexure. Concern for primary malignancy. No prior colonoscopy. Has a family history of colon cancer in her brother. History of HIV on Biktarvy. Follows with ID at       RECOMMENDATIONS:    Follow-up on IR guided liver biopsy  Follow-up on AFP and CA 19-9. Will add CEA  Will discuss case with Dr. Aura Sanches. Please await his further input and recommendations      If you have any questions or need any further information, please feel free to contact our consult team.  Thank you for allowing us to participate in the care of [facility]. The note was completed using Dragon voice recognition transcription. Every effort was made to ensure accuracy; however, inadvertent transcription errors may be present despite my best efforts to edit errors.       Arsen Akins Seth Harrison PA-C

## 2023-03-03 NOTE — PROGRESS NOTES
Medication Reconciliation     List of medications patient is currently taking is complete. Source of information:   1. Conversation with patient at bedside w/ prompting. 2. EPIC records        Notes regarding home medications:  1. Patient received all of her AM home medications today PTA. Denies any other OTC/herbal medications. Gianni Lechuga, Pharmacy Intern.

## 2023-03-03 NOTE — PROGRESS NOTES
Checking on patient Q2H for nutrition needs, hygiene needs, comfort measures, mobility, fall risk interventions, and safe environment. All precautions and interventions in place. Educated patient on use of call light and telephone. Patient verbalizes understanding. Call light/telephone in reach.   Electronically signed by Tori Salazar RN on 3/3/2023 at 7:54 AM

## 2023-03-03 NOTE — CARE COORDINATION
Case Management Assessment  Initial Evaluation    Date/Time of Evaluation: 3/3/2023 3:37 PM  Assessment Completed by: Mendel Lose    If patient is discharged prior to next notation, then this note serves as note for discharge by case management. Patient Name: Melody Holliday                   YOB: 1965  Diagnosis: Shortness of breath [R06.02]  Tachypnea [R06.82]  Dyspnea [R06.00]  Tachycardia [R00.0]  Anemia, unspecified type [D64.9]                   Date / Time: 3/2/2023  2:15 PM    Patient Admission Status: Inpatient   Readmission Risk (Low < 19, Mod (19-27), High > 27): Readmission Risk Score: 8.1    Current PCP: XIN Torres - CNP  PCP verified by CM? Yes (followed by [address] at [facility])    Chart Reviewed: Yes      History Provided by: Patient  Patient Orientation: Alert and Oriented, Person, Place, Situation    Patient Cognition: Alert    Hospitalization in the last 30 days (Readmission):  No    If yes, Readmission Assessment in CM Navigator will be completed. Advance Directives:      Code Status: Full Code   Patient's Primary Decision Maker is: Legal Next of Kin    [facility]  [facility] - [phone]    Discharge Planning:    Patient lives with: Alone Type of Home: Apartment  Primary Care Giver: Self  Patient Support Systems include: Children, Parent   Current Financial resources: Medicaid  Current community resources:    Current services prior to admission: None            Current DME:              Type of Home Care services:  None    ADLS  Prior functional level: Independent in ADLs/IADLs  Current functional level: Independent in ADLs/IADLs    PT AM-PAC:   /24  OT AM-PAC:   /24    Family can provide assistance at DC: Other (comment) (unsure)  Would you like Case Management to discuss the discharge plan with any other family members/significant others, and if so, who?     Plans to Return to Present Housing: Yes  Other Identified Issues/Barriers to RETURNING to current housing: yes  Potential Assistance needed at discharge: N/A            Potential DME:    Patient expects to discharge to: [address] [place] [address] for transportation at discharge: Other (see comment) (uses insurance transport option or family)    Financial    Payor: Joe Valentin / Plan: Swati Suarez / Product Type: *No Product type* /     Potential assistance Purchasing Medications: No  Meds-to-Beds request: Yes      [id number] [address] - P [phone] - [facility] [address]  Phone: [phone] Fax: [phone]      Notes:    Factors facilitating achievement of predicted outcomes: Pleasant    Barriers to discharge: Unknown at this time    Additional Case Management Notes: Patient plans to return home and is unsure of discharge needs at the present.      The Plan for Transition of Care is related to the following treatment goals of Shortness of breath [R06.02]  Tachypnea [R06.82]  Dyspnea [R06.00]  Tachycardia [R00.0]  Anemia, unspecified type [D64.9]    The Patient and/or Patient Representative Agree with the Discharge Plan? yes    Electronically signed by JOSE ALEJANDRO Bourne, DARIUSW, Case Management on 3/3/2023 at 3:39 PM  [facility]

## 2023-03-03 NOTE — PROGRESS NOTES
4 Eyes Admission Assessment     I agree as the admission nurse that 2 RN's have performed a thorough Head to Toe Skin Assessment on the patient. ALL assessment sites listed below have been assessed on admission. Areas assessed by both nurses:   [x]   Head, Face, and Ears   [x]   Shoulders, Back, and Chest  [x]   Arms, Elbows, and Hands   [x]   Coccyx, Sacrum, and Ischum  [x]   Legs, Feet, and Heels        Does the Patient have Skin Breakdown?   No         Rodrigo Prevention initiated:  No   Wound Care Orders initiated:  No      [facility] nurse consulted for Pressure Injury (Stage 3,4, Unstageable, DTI, NWPT, and Complex wounds):  No      Nurse 1 eSignature: Electronically signed by Cecille Roman RN on 3/3/23 at 2:28 AM EST    **SHARE this note so that the co-signing nurse is able to place an eSignature**    Nurse 2 eSignature: Electronically signed by Merlin Akbar RN on 3/3/23 at 3:42 AM EST

## 2023-03-03 NOTE — PROGRESS NOTES
Hospitalist Progress Note    CC: Dyspnea      Admit date: 3/2/2023  Days in hospital:  1    Subjective/interval history: Pt S/E. No acute events. She denies pain, sob, n/v.    O2 status: room air    ROS:   Pertinent items are noted in HPI. .  Objective:    /81   Pulse (!) 105   Temp 98.8 °F (37.1 °C) (Oral)   Resp 16   Ht 5' 4\" (1.626 m)   Wt 160 lb 7.9 oz (72.8 kg)   SpO2 97%   BMI 27.55 kg/m²     Gen: alert, NAD  HEENT: NC/AT, moist mucous membranes, no oropharyngeal erythema or exudate  Neck: supple, trachea midline, no anterior cervical or SC LAD  Heart: Normal s1/s2, RRR, no murmurs, gallops, or rubs. Lungs: clear bilaterally, no wheezing, no rales, no rhonchi, no use of accessory muscles  Abd: bowel sounds present, soft, nontender, nondistended, no masses  Extrem: No clubbing, cyanosis, no edema  Skin: no rashes or lesions  Psych: A & O x3, affect appropriate  Neuro: grossly intact, moves all four extremities spontaneously.   Cap refill: +2 sec    Medications:  Scheduled Meds:   potassium chloride  40 mEq Oral Once    bictegravir-emtricitab-tenofovir alafenamide  1 tablet Oral Daily    sulfamethoxazole-trimethoprim  1 tablet Oral Nightly    Vitamin D  1,000 Units Oral Daily    atorvastatin  10 mg Oral Daily    sodium chloride flush  5-40 mL IntraVENous 2 times per day    enoxaparin  40 mg SubCUTAneous Daily       PRN Meds:  LORazepam, diphenhydrAMINE, sodium chloride flush, sodium chloride, ondansetron **OR** ondansetron, polyethylene glycol, acetaminophen **OR** acetaminophen, melatonin    IV:   sodium chloride         No intake or output data in the 24 hours ending 03/03/23 0932    Results:  CBC:   Recent Labs     03/02/23  1457 03/03/23  0505   WBC 10.4 10.6   HGB 9.7* 9.0*   HCT 29.9* 27.2*   MCV 80.2 78.6*   * 433     BMP:   Recent Labs     03/02/23  1457 03/03/23  0505    137   K 4.6 2.8*    98*   CO2 25 25   BUN 7 5*   CREATININE 0.9 0.8     Mag: No results for input(s): MAG in the last 72 hours. Phos: No results found for: PHOS  No results found for: GLU    LIVER PROFILE: No results for input(s): AST, ALT, LIPASE, BILIDIR, BILITOT, ALKPHOS in the last 72 hours. Invalid input(s): AMYLASE,  ALB  PT/INR: No results for input(s): PROTIME, INR in the last 72 hours. APTT: No results for input(s): APTT in the last 72 hours. UA:No results for input(s): NITRITE, COLORU, PHUR, LABCAST, WBCUA, RBCUA, MUCUS, TRICHOMONAS, YEAST, BACTERIA, CLARITYU, SPECGRAV, LEUKOCYTESUR, UROBILINOGEN, BILIRUBINUR, BLOODU, GLUCOSEU, AMORPHOUS in the last 72 hours. Invalid input(s): Christina Esparza input(s): ABG  Lab Results   Component Value Date    CALCIUM 8.4 03/03/2023       Assessment:    Principal Problem:    Dyspnea  Resolved Problems:    * No resolved hospital problems. [facility] course:  65 y.o. female with h/o hiv, copd, who presented to [facility] [place] with dyspnea. She was found to have new liver lesions and hepatomegaly. Has hx of HIV - she thinks for > 20 years. She reports she isn't sure how she got it. Managed over at [address]. Denies hx of IVDA. She reports previously weighing about >200 lbs in 2022. Weight on presentation 160.     Plan:  Multiple liver masses:   Transaminitis   - noted on CT scan 3/2/23   - liver MRI with widespread mets and dewey wall thickening - possible colon primary  - AFP, CA 19-9 with AM labs   - consult IR for biopsy  - consult oncology  - d/w the Pt, she has no questions at this time    Dyspnea, in patient with HIV:   - echo is reassuring, normal levf  - presenting TNI negative at <0.01   - telemetry     Chronic conditions - continue home meds unless otherwise stated  HIV:   - cont biktarvy -25   - bactrim po daily     Code status:  full  DVT prophylaxis: [x] Lovenox  [] SQ Heparin  [] SCDs  [] warfarin/oral direct thrombin inhibitor [] Encourage ambulation      Disposition:  [] Home [] Rehab [] Psych [] SNF  [] LTAC [] Transfer to ICU  [] Transfer to PCU [] Other: in pt    Electronically signed by Jerome Diallo DO on 3/3/2023 at 9:32 AM

## 2023-03-03 NOTE — PROGRESS NOTES
RN went to MRI to start new IV. Pt tolerated well.  Electronically signed by Josué Blanco RN on 3/3/2023 at 12:27 PM

## 2023-03-03 NOTE — PLAN OF CARE
Problem: Discharge Planning  Goal: Discharge to home or other facility with appropriate resources  3/3/2023 0753 by Tori Salazar RN  Outcome: Progressing  Flowsheets (Taken 3/3/2023 0234 by Fiordaliza Zuñiga RN)  Discharge to home or other facility with appropriate resources: Identify barriers to discharge with patient and caregiver  3/3/2023 0229 by Fiordaliza Zuñiga RN  Outcome: Progressing  Flowsheets (Taken 3/3/2023 0229)  Discharge to home or other facility with appropriate resources: Identify barriers to discharge with patient and caregiver     Problem: Respiratory - Adult  Goal: Achieves optimal ventilation and oxygenation  3/3/2023 0753 by Tori Salazar RN  Outcome: Progressing  Flowsheets (Taken 3/3/2023 0229 by Fiordaliza Zuñiga RN)  Achieves optimal ventilation and oxygenation: Assess for changes in respiratory status  3/3/2023 0229 by Fiordaliza Zuñiga RN  Outcome: Progressing  Flowsheets (Taken 3/3/2023 0229)  Achieves optimal ventilation and oxygenation: Assess for changes in respiratory status     Problem: Infection - Adult  Goal: Absence of infection at discharge  3/3/2023 0753 by Tori Salazar RN  Outcome: Progressing  Flowsheets (Taken 3/3/2023 0229 by Fiordaliza Zuñiga RN)  Absence of infection at discharge: Assess and monitor for signs and symptoms of infection  3/3/2023 0229 by Fiordaliza Zuñiga RN  Outcome: Progressing  Flowsheets (Taken 3/3/2023 0229)  Absence of infection at discharge: Assess and monitor for signs and symptoms of infection     Problem: Safety - Adult  Goal: Free from fall injury  Outcome: Progressing  Flowsheets (Taken 3/3/2023 0753)  Free From Fall Injury:   Instruct family/caregiver on patient safety   Based on caregiver fall risk screen, instruct family/caregiver to ask for assistance with transferring infant if caregiver noted to have fall risk factors

## 2023-03-03 NOTE — PLAN OF CARE
Problem: Discharge Planning  Goal: Discharge to home or other facility with appropriate resources  Outcome: Progressing  Flowsheets (Taken 3/3/2023 0229)  Discharge to home or other facility with appropriate resources: Identify barriers to discharge with patient and caregiver     Problem: Respiratory - Adult  Goal: Achieves optimal ventilation and oxygenation  Outcome: Progressing  Flowsheets (Taken 3/3/2023 0229)  Achieves optimal ventilation and oxygenation: Assess for changes in respiratory status     Problem: Infection - Adult  Goal: Absence of infection at discharge  Outcome: Progressing  Flowsheets (Taken 3/3/2023 0229)  Absence of infection at discharge: Assess and monitor for signs and symptoms of infection

## 2023-03-03 NOTE — PROGRESS NOTES
Physician paged to get medication for patient's claustrophobia in order to get MRI completed. Awaiting call back.

## 2023-03-03 NOTE — PROGRESS NOTES
Pt arrived to floor from ER at 2230 via stretcher. Pt oriented to room, call light, policies and procedures, the menu and ordering. Call light within reach. Bed in lowest position, bed alarm on, and wheels locked. Pt verbalized understanding. No complaints, questions or concerns at this time.

## 2023-03-03 NOTE — CONSULTS
Oncology [facility]    Consult Note      Requesting Physician:  Ann Marie Shetty DO    CHIEF COMPLAINT:  probable mets colon cancer      HISTORY OF PRESENT ILLNESS:    Ms. Deepti Perea  is a 65 y.o. female we are seeing in consultation for probable mets colon cancer. This is a patient with history of HIV on HAART therapy who presented to the hospital with a several day history of increasing weakness as well as dyspnea on exertion. A CT pulmonary embolus study in the emergency room that showed no evidence of PE but did show multiple liver lesions. She then went on to have an MRI of her abdomen today that showed nonspecific thickening at the GE junction. There were also multiple masses seen throughout the liver in both the right and left hepatic lobe. .  There was also suspected wall thickening of the colon in the region of the splenic flexure. She is scheduled for a CT-guided liver biopsy and an EGD and colonoscopy--both of which will likely happen on Monday. Oncology was consulted for further work-up and management. In terms of her HIV, it is well controlled. Her viral load is undetectable and her CD4 count is greater than 300. She is followed by infectious disease at [facility]. Past Medical History:  Past Medical History:   Diagnosis Date    AIDS (acquired immune deficiency syndrome) ([address]        Past Surgical History:  History reviewed. No pertinent surgical history.     Current Medications:  Current Facility-Administered Medications   Medication Dose Route Frequency Provider Last Rate Last Admin    potassium chloride (KLOR-CON) extended release tablet 40 mEq  40 mEq Oral Once Faith Sloan DO        oxyCODONE (ROXICODONE) immediate release tablet 5 mg  5 mg Oral Q4H PRN Faith Sloan DO        bictegravir-emtricitab-tenofovir alafenamide (BIKTARVY) -25 MG per tablet 1 tablet  1 tablet Oral Daily Monie Swift MD        sulfamethoxazole-trimethoprim (BACTRIM DS;SEPTRA DS) 800-160 MG per tablet 1 tablet  1 tablet Oral Nightly Monie Swift MD   1 tablet at 03/02/23 2334    Vitamin D (CHOLECALCIFEROL) tablet 1,000 Units  1,000 Units Oral Daily Monie Swift MD        diphenhydrAMINE (BENADRYL) tablet 25 mg  25 mg Oral Q6H PRN Monie Swift MD        atorvastatin (LIPITOR) tablet 10 mg  10 mg Oral Daily Chester Gill MD        sodium chloride flush 0.9 % injection 5-40 mL  5-40 mL IntraVENous 2 times per day Monie Swift MD   10 mL at 03/03/23 1118    sodium chloride flush 0.9 % injection 5-40 mL  5-40 mL IntraVENous PRN Monie Swift MD        0.9 % sodium chloride infusion   IntraVENous PRN Monie Swift MD        enoxaparin (LOVENOX) injection 40 mg  40 mg SubCUTAneous Daily Chester Gill MD        ondansetron (ZOFRAN-ODT) disintegrating tablet 4 mg  4 mg Oral Q8H PRN Chester Michael MD        Or    ondansetron (ZOFRAN) injection 4 mg  4 mg IntraVENous Q6H PRN Chester Michael MD        polyethylene glycol (GLYCOLAX) packet 17 g  17 g Oral Daily PRN Monie wSift MD        acetaminophen (TYLENOL) tablet 650 mg  650 mg Oral Q6H PRN Chester Michael MD        Or    acetaminophen (TYLENOL) suppository 650 mg  650 mg Rectal Q6H PRN Monie Swift MD        melatonin tablet 3 mg  3 mg Oral Nightly PRN XIN Scott - CNP   3 mg at 03/02/23 2350       Allergies:  No Known Allergies    Social History:  Social History     Socioeconomic History    Marital status:      Spouse name: Not on file    Number of children: Not on file    Years of education: Not on file    Highest education level: Not on file   Occupational History    Not on file   Tobacco Use    Smoking status: Never    Smokeless tobacco: Never   Substance and Sexual Activity    Alcohol use: Not Currently    Drug use: Not Currently    Sexual activity: Not on file Other Topics Concern    Not on file   Social History Narrative    Not on file     Social Determinants of Health     Financial Resource Strain: Not on file   Food Insecurity: Not on file   Transportation Needs: Not on file   Physical Activity: Not on file   Stress: Not on file   Social Connections: Not on file   Intimate Partner Violence: Not on file   Housing Stability: Not on file          Family History:  History reviewed. No pertinent family history. REVIEW OF SYSTEMS:      Constitutional: Denies fever, sweats, weight loss  Eyes: No visual changes or diplopia. No scleral icterus. Ent: No headaches, hearing loss or vertigo. No mouth sores or sore throat. Cardiovascular: No chest pain, dyspnea on exertion, palpitations or loss of consciousness. Respiratory: No cough or wheezing, no sputum production. No hemoptysis. Gastrointestinal: No abdominal pain, appetite loss, blood in stools. No change in bowel habits. Genitourinary: No dysuria, trouble voiding, or hematuria. Musculoskeletal: No generalized weakness. No joint complaints. Integumentary: No rash or pruritus. Neurological: No headache, diplopia. No change in gait, balance, or coordination. No paresthesias. Endocrine: No temperature intolerance. No excessive thirst, fluid intake, urination. Hematologic/lymphatic: No abnormal bruising or ecchymosis, blood clots or swollen lymph nodes. Allergic/immunologic: No nasal congestion or hives.         PHYSICAL EXAM:      Vitals:  Patient Vitals for the past 24 hrs:   BP Temp Temp src Pulse Resp SpO2 Height Weight   03/03/23 0844 -- -- -- -- 16 97 % -- --   03/03/23 0708 138/81 98.8 °F (37.1 °C) Oral (!) 105 16 98 % -- --   03/03/23 0344 122/79 98.3 °F (36.8 °C) Oral (!) 106 15 97 % -- --   03/03/23 0200 -- -- -- -- -- -- 5' 4\" (1.626 m) 160 lb 7.9 oz (72.8 kg)   03/02/23 2238 110/85 -- -- -- -- -- -- --   03/02/23 2237 (!) 149/114 98.3 °F (36.8 °C) Oral (!) 123 16 98 % -- --   03/02/23 8210 (!) 142/93 -- -- (!) 106 24 100 % -- --   03/02/23 1900 (!) 116/97 -- -- (!) 106 15 99 % -- --   03/02/23 1700 (!) 139/93 -- -- 100 21 100 % -- --   03/02/23 1620 -- -- -- (!) 102 (!) 32 99 % -- --       Date 03/03/23 0000 - 03/03/23 2359   Shift 7487-2827 8443-6298 5918-3107 24 Hour Total   INTAKE   P.O.(mL/kg/hr) 0(0) 0(0)  0   Shift Total(mL/kg) 0(0) 0(0)  0(0)   OUTPUT   Shift Total(mL/kg)       Weight (kg) 72.8 72.8 72.8 72.8       CONSTITUTIONAL: awake, alert, cooperative, no apparent distress   EYES: pupils equal, round and reactive to light, sclera clear and conjunctiva normal  ENT: Normocephalic, without obvious abnormality, atraumatic  NECK: supple, symmetrical, no jugular venous distension and no carotid bruits   HEMATOLOGIC/LYMPHATIC: no cervical, supraclavicular or axillary lymphadenopathy   LUNGS: no increased work of breathing and clear to auscultation   CARDIOVASCULAR: regular rate and rhythm, normal S1 and S2, no murmur noted  ABDOMEN: normal bowel sounds x 4, soft, non-distended, non-tender, no masses palpated, no hepatosplenomegaly   MUSCULOSKELETAL: full range of motion noted, tone is normal  NEUROLOGIC: awake, alert, oriented to name, place and time. Motor skills grossly intact. SKIN: Normal skin color, texture, turgor and no jaundice. appears intact   EXTREMITIES: no LE edema       DATA:    PT/INR:  No results for input(s): PROT, INR in the last 720 hours. PTT:  No results for input(s): APTT in the last 720 hours.     CMP:    Recent Labs     03/03/23  0505      K 2.8*   CL 98*   CO2 25   BUN 5*     Mg:    Recent Labs     03/03/23  0505   MG 2.10       Lab Results   Component Value Date    CALCIUM 8.4 03/03/2023       CBC:    Recent Labs     03/03/23  0505 03/02/23  1457   WBC 10.6 10.4   NEUTROABS 8.3* 8.1*   LYMPHOPCT 14.2 15.6   RBC 3.46* 3.74*   HGB 9.0* 9.7*   HCT 27.2* 29.9*   MCV 78.6* 80.2   MCH 26.0 25.9*   MCHC 33.1 32.3   RDW 21.5* 21.6*    498*        LDH:No results for input(s): LDH in the last 720 hours. Radiology Review:  MRI ABDOMEN W WO CONTRAST  Narrative: EXAMINATION:  MRI OF THE ABDOMEN WITHOUT AND WITH CONTRAST, 3/3/2023 12:01 pm    TECHNIQUE:  Multiplanar multisequence MRI of the abdomen was performed without and with  the administration of intravenous contrast.    COMPARISON:  Recent chest CT    HISTORY:  ORDERING SYSTEM PROVIDED HISTORY: concern for liver malignancy - multiple  liver masses on CT  TECHNOLOGIST PROVIDED HISTORY:  Reason for exam:->concern for liver malignancy - multiple liver masses on CT  Specify organ?->Liver  Reason for Exam: concern for liver malignancy - multiple liver masses on CT    FINDINGS:  Motion artifact degrades the study. This decreases sensitivity and  specificity. Small hiatal hernia seen. There is nonspecific thickening at the GE junction    Innumerable masses are seen throughout the liver, in both the right and left  hepatic lobes, which enhance on postcontrast images, most likely metastasis. Some of the areas have areas of T2 hyperintense signal change internally,  suggesting necrosis. An index mass anteriorly in the right hepatic lobe,  just beneath the anterior abdominal wall measures 4.4 cm x 3.6 cm. .  These  masses are hyperintense on diffusion-weighted images. Adrenal glands unremarkable. No hydronephrosis on the right. No hydronephrosis on the left    No pancreatic ductal dilatation noted. No retroperitoneal adenopathy. No aortic aneurysm. Scattered colonic diverticula are seen. Incompletely evaluated is questionable wall thickening of the colon in the  region of the splenic flexure. This is seen on coronal images. Spurring is seen in the spine  Impression: Widespread hepatic metastasis. Suspected wall thickening of the colon in the region of the splenic flexure.   Consider colonoscopy to rule out colonic mass as a cause of the hepatic  metastasis      Problem List  Patient Active Problem List Diagnosis    Dyspnea       IMPRESSION/RECOMMENDATIONS:  1. Probable metastatic colon cancer. This CT and MRI are consistent with a probable metastatic colon cancer. She also has thickening at the GE junction. I agree with an EGD and colonnoscopy on Monday to help confirm the primary. I also agree that she should likely have a CT-guided liver biopsy. This will provide more tissue for next generation molecular sequencing. If this does represent metastatic colorectal cancer, the treatment would likely be palliative and consist of chemotherapy. I will base my initial chemotherapy regimen on the molecular sequencing. She can decide whether to follow-up with me upon discharge or have her care coordinated at [facility] since her infectious disease doctor is there. 2.  Anemia. Her iron studies are consistent with acute inflammation but there may be a component of iron deficiency. I will give a couple doses of IV iron. This could be contributing to her dyspnea on exertion. Thank you for asking me to see the patient.        Roseann Collazo MD  Please Contact Through [employer]

## 2023-03-04 PROBLEM — C78.7 LIVER METASTASES: Status: ACTIVE | Noted: 2023-03-04

## 2023-03-04 LAB
AFP: 2.5 UG/L
CA 19-9: 1 U/ML (ref 0–35)

## 2023-03-04 PROCEDURE — 6370000000 HC RX 637 (ALT 250 FOR IP): Performed by: INTERNAL MEDICINE

## 2023-03-04 PROCEDURE — 6370000000 HC RX 637 (ALT 250 FOR IP): Performed by: FAMILY MEDICINE

## 2023-03-04 PROCEDURE — 2580000003 HC RX 258: Performed by: PEDIATRICS

## 2023-03-04 PROCEDURE — 6370000000 HC RX 637 (ALT 250 FOR IP): Performed by: PEDIATRICS

## 2023-03-04 PROCEDURE — 2060000000 HC ICU INTERMEDIATE R&B

## 2023-03-04 PROCEDURE — 6360000002 HC RX W HCPCS: Performed by: PEDIATRICS

## 2023-03-04 RX ORDER — POLYETHYLENE GLYCOL 3350 17 G/17G
17 POWDER, FOR SOLUTION ORAL DAILY
Status: DISCONTINUED | OUTPATIENT
Start: 2023-03-04 | End: 2023-03-04

## 2023-03-04 RX ORDER — SENNA AND DOCUSATE SODIUM 50; 8.6 MG/1; MG/1
1 TABLET, FILM COATED ORAL DAILY
Status: DISCONTINUED | OUTPATIENT
Start: 2023-03-04 | End: 2023-03-08 | Stop reason: HOSPADM

## 2023-03-04 RX ORDER — POLYETHYLENE GLYCOL 3350 17 G/17G
17 POWDER, FOR SOLUTION ORAL 3 TIMES DAILY
Status: DISPENSED | OUTPATIENT
Start: 2023-03-04 | End: 2023-03-05

## 2023-03-04 RX ADMIN — POLYETHYLENE GLYCOL 3350 17 G: 17 POWDER, FOR SOLUTION ORAL at 10:44

## 2023-03-04 RX ADMIN — SULFAMETHOXAZOLE AND TRIMETHOPRIM 1 TABLET: 800; 160 TABLET ORAL at 21:16

## 2023-03-04 RX ADMIN — Medication 1000 UNITS: at 08:20

## 2023-03-04 RX ADMIN — ENOXAPARIN SODIUM 40 MG: 100 INJECTION SUBCUTANEOUS at 08:21

## 2023-03-04 RX ADMIN — Medication 10 ML: at 21:20

## 2023-03-04 RX ADMIN — OXYCODONE 5 MG: 5 TABLET ORAL at 21:16

## 2023-03-04 RX ADMIN — BICTEGRAVIR SODIUM, EMTRICITABINE, AND TENOFOVIR ALAFENAMIDE FUMARATE 1 TABLET: 50; 200; 25 TABLET ORAL at 08:20

## 2023-03-04 RX ADMIN — ATORVASTATIN CALCIUM 10 MG: 10 TABLET, FILM COATED ORAL at 08:20

## 2023-03-04 RX ADMIN — ONDANSETRON 4 MG: 2 INJECTION INTRAMUSCULAR; INTRAVENOUS at 08:18

## 2023-03-04 RX ADMIN — POLYETHYLENE GLYCOL 3350 17 G: 17 POWDER, FOR SOLUTION ORAL at 21:16

## 2023-03-04 RX ADMIN — Medication 10 ML: at 08:21

## 2023-03-04 RX ADMIN — SENNOSIDES AND DOCUSATE SODIUM 1 TABLET: 50; 8.6 TABLET ORAL at 10:45

## 2023-03-04 ASSESSMENT — PAIN DESCRIPTION - DESCRIPTORS: DESCRIPTORS: ACHING

## 2023-03-04 ASSESSMENT — PAIN SCALES - GENERAL
PAINLEVEL_OUTOF10: 8
PAINLEVEL_OUTOF10: 0

## 2023-03-04 ASSESSMENT — PAIN DESCRIPTION - ORIENTATION: ORIENTATION: LEFT;UPPER

## 2023-03-04 ASSESSMENT — PAIN DESCRIPTION - LOCATION: LOCATION: ABDOMEN

## 2023-03-04 NOTE — PLAN OF CARE
Problem: Discharge Planning  Goal: Discharge to home or other facility with appropriate resources  3/4/2023 1107 by Akira Spicer RN  Outcome: Progressing  3/3/2023 2118 by Aj Avila RN  Outcome: Progressing  Flowsheets (Taken 3/3/2023 2118)  Discharge to home or other facility with appropriate resources:   Identify barriers to discharge with patient and caregiver   Arrange for needed discharge resources and transportation as appropriate     Problem: Respiratory - Adult  Goal: Achieves optimal ventilation and oxygenation  3/4/2023 1107 by Akira Spicer RN  Outcome: Progressing  3/3/2023 2118 by Aj Avila RN  Outcome: Progressing  Flowsheets (Taken 3/3/2023 2118)  Achieves optimal ventilation and oxygenation:   Assess for changes in respiratory status   Assess for changes in mentation and behavior   Position to facilitate oxygenation and minimize respiratory effort   Oxygen supplementation based on oxygen saturation or arterial blood gases     Problem: Infection - Adult  Goal: Absence of infection at discharge  3/4/2023 1107 by Akira Spicer RN  Outcome: Progressing  3/3/2023 2118 by Aj Avila RN  Outcome: Progressing  Flowsheets (Taken 3/3/2023 2118)  Absence of infection at discharge:   Assess and monitor for signs and symptoms of infection   Monitor lab/diagnostic results   Monitor all insertion sites i.e., indwelling lines, tubes and drains     Problem: Safety - Adult  Goal: Free from fall injury  3/4/2023 1107 by Akira Spicer RN  Outcome: Progressing  3/3/2023 2118 by Aj Avila RN  Outcome: Progressing  [facility] (Taken 3/3/2023 2118)  Free From Fall Injury: Instruct family/caregiver on patient safety     Problem: Pain  Goal: Verbalizes/displays adequate comfort level or baseline comfort level  3/4/2023 1107 by Akira Spicer RN  Outcome: Progressing  Flowsheets (Taken 3/4/2023 0820)  Verbalizes/displays adequate comfort level or baseline comfort level: Encourage patient to monitor pain and request assistance  3/3/2023 2118 by Kirt Feliciano RN  Outcome: Progressing  Flowsheets (Taken 3/3/2023 2118)  Verbalizes/displays adequate comfort level or baseline comfort level:   Encourage patient to monitor pain and request assistance   Assess pain using appropriate pain scale   Administer analgesics based on type and severity of pain and evaluate response   Implement non-pharmacological measures as appropriate and evaluate response

## 2023-03-04 NOTE — PROGRESS NOTES
Gastroenterology Progress Note    Yandy García is a 65 y.o. female patient. Principal Problem:    Dyspnea  Resolved Problems:    * No resolved hospital problems. *      SUBJECTIVE:  Had nausea improved with anti-emetics. Mild LUQ abdominal discomfort. No fevers. No hematochezia. Current Facility-Administered Medications: potassium chloride (KLOR-CON) extended release tablet 40 mEq, 40 mEq, Oral, Once  oxyCODONE (ROXICODONE) immediate release tablet 5 mg, 5 mg, Oral, Q4H PRN  bictegravir-emtricitab-tenofovir alafenamide (BIKTARVY) -25 MG per tablet 1 tablet, 1 tablet, Oral, Daily  sulfamethoxazole-trimethoprim (BACTRIM DS;SEPTRA DS) 800-160 MG per tablet 1 tablet, 1 tablet, Oral, Nightly  Vitamin D (CHOLECALCIFEROL) tablet 1,000 Units, 1,000 Units, Oral, Daily  diphenhydrAMINE (BENADRYL) tablet 25 mg, 25 mg, Oral, Q6H PRN  atorvastatin (LIPITOR) tablet 10 mg, 10 mg, Oral, Daily  sodium chloride flush 0.9 % injection 5-40 mL, 5-40 mL, IntraVENous, 2 times per day  sodium chloride flush 0.9 % injection 5-40 mL, 5-40 mL, IntraVENous, PRN  0.9 % sodium chloride infusion, , IntraVENous, PRN  enoxaparin (LOVENOX) injection 40 mg, 40 mg, SubCUTAneous, Daily  ondansetron (ZOFRAN-ODT) disintegrating tablet 4 mg, 4 mg, Oral, Q8H PRN **OR** ondansetron (ZOFRAN) injection 4 mg, 4 mg, IntraVENous, Q6H PRN  polyethylene glycol (GLYCOLAX) packet 17 g, 17 g, Oral, Daily PRN  acetaminophen (TYLENOL) tablet 650 mg, 650 mg, Oral, Q6H PRN **OR** acetaminophen (TYLENOL) suppository 650 mg, 650 mg, Rectal, Q6H PRN  melatonin tablet 3 mg, 3 mg, Oral, Nightly PRN    Physical    VITALS:  /79   Pulse 99   Temp 97.5 °F (36.4 °C) (Oral)   Resp 20   Ht 5' 4\" (1.626 m)   Wt 163 lb 2.3 oz (74 kg)   SpO2 96%   BMI 28.00 kg/m²   TEMPERATURE:  Current - Temp: 97.5 °F (36.4 °C);  Max - Temp  Av.3 °F (36.8 °C)  Min: 97.5 °F (36.4 °C)  Max: 99.2 °F (37.3 °C)    NAD  Eyes: No icterus  RRR  Lungs CTA Bilaterally, normal effort  Abdomen soft, ND, NT, Bowel sounds normal.  Ext: no edema  Neuro: No tremor  Psych: A&Ox3    Data    Data Review:    Recent Labs     23  1457 23  0505   WBC 10.4 10.6   HGB 9.7* 9.0*   HCT 29.9* 27.2*   MCV 80.2 78.6*   * 433     Recent Labs     23  1457 23  0505    137   K 4.6 2.8*    98*   CO2 25 25   BUN 7 5*   CREATININE 0.9 0.8     No results for input(s): AST, ALT, ALB, BILIDIR, BILITOT, ALKPHOS in the last 72 hours. No results for input(s): LIPASE, AMYLASE in the last 72 hours. No results for input(s): PROTIME, INR in the last 72 hours. No results for input(s): PTT in the last 72 hours. ASSESSMENT:   65 y.o. female with a PMH of HIV who presented on 3/2/2023 with dyspnea. CT chest showed multiple liver masses. Follow up MRI abd showed innumerable masses throughout the liver, in both the right and left hepatic lobes, which enhance on postcontrast images, most likely metastasis. Incompletely evaluated is questionable wall thickening of the colon in the region of the splenic flexure. IR liver biopsy ordered and pending. Patient has never had a colonoscopy. Brother  of colon cancer but is not sure what age she was diagnosed. She had had 30 to 40 pound weight loss over the past 1 to 2 months. Imp: Metastatic cancer unknown primary, suspect colon. Plan: EGD/colon Monday  Will give TID miralax for constipation today. I put in bowel prep for colonoscopy on Monday and NPO p mn on  night. Thank you for allowing me to participate in the care of your patient. Please feel free to contact me with any concerns.   [address], [place]

## 2023-03-04 NOTE — PROGRESS NOTES
Hospitalist Progress Note    CC: Dyspnea      Admit date: 3/2/2023  Days in hospital:  2    Subjective/interval history: Pt S/E. No acute events. She is having some luq pain and constipation. No n/v.    O2 status: room air    ROS:   Pertinent items are noted in HPI. .  Objective:    /79   Pulse 99   Temp 97.5 °F (36.4 °C) (Oral)   Resp 20   Ht 5' 4\" (1.626 m)   Wt 163 lb 2.3 oz (74 kg)   SpO2 96%   BMI 28.00 kg/m²     Gen: alert, NAD  HEENT: NC/AT, moist mucous membranes  Neck: supple, trachea midline  Heart: Normal s1/s2, RRR, no murmurs, gallops, or rubs. Lungs: clear bilaterally, no wheezing, no rales, no rhonchi, no use of accessory muscles  Abd: bowel sounds present, soft, nontender, nondistended, no masses  Extrem: No clubbing, cyanosis, no edema  Skin: no rashes or lesions  Psych: A & O x3, affect appropriate  Neuro: grossly intact, moves all four extremities spontaneously.   Cap refill: +2 sec    Medications:  Scheduled Meds:   potassium chloride  40 mEq Oral Once    bictegravir-emtricitab-tenofovir alafenamide  1 tablet Oral Daily    sulfamethoxazole-trimethoprim  1 tablet Oral Nightly    Vitamin D  1,000 Units Oral Daily    atorvastatin  10 mg Oral Daily    sodium chloride flush  5-40 mL IntraVENous 2 times per day    enoxaparin  40 mg SubCUTAneous Daily       PRN Meds:  oxyCODONE, diphenhydrAMINE, sodium chloride flush, sodium chloride, ondansetron **OR** ondansetron, polyethylene glycol, acetaminophen **OR** acetaminophen, melatonin    IV:   sodium chloride           Intake/Output Summary (Last 24 hours) at 3/4/2023 0743  Last data filed at 3/4/2023 0636  Gross per 24 hour   Intake 600 ml   Output --   Net 600 ml       Results:  CBC:   Recent Labs     03/02/23  1457 03/03/23  0505   WBC 10.4 10.6   HGB 9.7* 9.0*   HCT 29.9* 27.2*   MCV 80.2 78.6*   * 433     BMP:   Recent Labs     03/02/23  1457 03/03/23  0505    137   K 4.6 2.8*    98*   CO2 25 25 BUN 7 5*   CREATININE 0.9 0.8     Mag: No results for input(s): MAG in the last 72 hours. Phos: No results found for: PHOS  No results found for: GLU    LIVER PROFILE: No results for input(s): AST, ALT, LIPASE, BILIDIR, BILITOT, ALKPHOS in the last 72 hours. Invalid input(s): AMYLASE,  ALB  PT/INR: No results for input(s): PROTIME, INR in the last 72 hours. APTT: No results for input(s): APTT in the last 72 hours. UA:No results for input(s): NITRITE, COLORU, PHUR, LABCAST, WBCUA, RBCUA, MUCUS, TRICHOMONAS, YEAST, BACTERIA, CLARITYU, SPECGRAV, LEUKOCYTESUR, UROBILINOGEN, BILIRUBINUR, BLOODU, GLUCOSEU, AMORPHOUS in the last 72 hours. Invalid input(s): Steven Lópeze input(s): ABG  Lab Results   Component Value Date    CALCIUM 8.4 03/03/2023       Assessment:    Principal Problem:    Dyspnea  Resolved Problems:    * No resolved hospital problems. [facility] course:  65 y.o. female with h/o hiv, copd, who presented to [facility] with dyspnea. She was found to have new liver lesions and hepatomegaly. Has hx of HIV - she thinks for > 20 years. She reports she isn't sure how she got it. Managed over at [address]. Denies hx of IVDA. She reports previously weighing about >200 lbs in 2022. Weight on presentation 160.     Plan:  Multiple liver masses:   Transaminitis   - noted on CT scan 3/2/23   - liver MRI with widespread mets and dewey wall thickening - possible colon primary  - AFP and ca 19-9 not elevated; cea 12K -> colon  - consult IR for biopsy  - consult oncology  - d/w the Pt, she has no questions at this time  - echo is reassuring, normal levf  - presenting TNI negative at <0.01   - telemetry     Chronic conditions - continue home meds unless otherwise stated  HIV:   - cont biktarvy -25   - bactrim po daily     Code status:  full  DVT prophylaxis: [x] Lovenox  [] SQ Heparin  [] SCDs  [] warfarin/oral direct thrombin inhibitor [] Encourage ambulation      Disposition:  [] Home [] Rehab [] Psych [] SNF  [] LTAC  [] Transfer to ICU  [] Transfer to PCU [] Other: in pt    Electronically signed by Brianna Sherman DO on 3/4/2023 at 7:43 AM

## 2023-03-04 NOTE — PROGRESS NOTES
Patient c/o pain to left upper quadrant and constipation. Dr. Santino Franklin notified via perfect serve.

## 2023-03-04 NOTE — PROGRESS NOTES
Patient is resting in bed, awake and quiet. Fall precautions are in place. Call light, telephone and bedside table are within reach. Will continue to monitor patient per unit protocols.  Electronically signed by Mariela Gurrola RN on 3/3/2023 at 7:46 PM

## 2023-03-04 NOTE — PLAN OF CARE
Problem: Discharge Planning  Goal: Discharge to home or other facility with appropriate resources  3/3/2023 2118 by David Rader RN  Outcome: Progressing  Flowsheets (Taken 3/3/2023 2118)  Discharge to home or other facility with appropriate resources:   Identify barriers to discharge with patient and caregiver   Arrange for needed discharge resources and transportation as appropriate     Problem: Respiratory - Adult  Goal: Achieves optimal ventilation and oxygenation  3/3/2023 2118 by David Rader RN  Outcome: Progressing  Flowsheets (Taken 3/3/2023 2118)  Achieves optimal ventilation and oxygenation:   Assess for changes in respiratory status   Assess for changes in mentation and behavior   Position to facilitate oxygenation and minimize respiratory effort   Oxygen supplementation based on oxygen saturation or arterial blood gases     Problem: Infection - Adult  Goal: Absence of infection at discharge  3/3/2023 2118 by David Rader RN  Outcome: Progressing  Flowsheets (Taken 3/3/2023 2118)  Absence of infection at discharge:   Assess and monitor for signs and symptoms of infection   Monitor lab/diagnostic results   Monitor all insertion sites i.e., indwelling lines, tubes and drains     Problem: Safety - Adult  Goal: Free from fall injury  3/3/2023 2118 by David Rader RN  Outcome: Progressing  Flowsheets (Taken 3/3/2023 2118)  Free From Fall Injury: Instruct family/caregiver on patient safety     Problem: Pain  Goal: Verbalizes/displays adequate comfort level or baseline comfort level  Outcome: Progressing  Flowsheets (Taken 3/3/2023 2118)  Verbalizes/displays adequate comfort level or baseline comfort level:   Encourage patient to monitor pain and request assistance   Assess pain using appropriate pain scale   Administer analgesics based on type and severity of pain and evaluate response   Implement non-pharmacological measures as appropriate and evaluate response

## 2023-03-04 NOTE — PROGRESS NOTES
Pt is alert and oriented x4, resting quietly in bed. Nightly medications and intake tolerated well. No complaints of nausea, vomiting, or pain. Fall precautions in place. Call light within reach. No other needs made known at this time. Will continue to monitor.     Electronically signed by Angy Waters RN on 3/3/2023 at 9:18 PM

## 2023-03-05 LAB
ANION GAP SERPL CALCULATED.3IONS-SCNC: 11 MMOL/L (ref 3–16)
BUN BLDV-MCNC: 5 MG/DL (ref 7–20)
CALCIUM SERPL-MCNC: 8.5 MG/DL (ref 8.3–10.6)
CHLORIDE BLD-SCNC: 97 MMOL/L (ref 99–110)
CO2: 26 MMOL/L (ref 21–32)
CREAT SERPL-MCNC: 0.9 MG/DL (ref 0.6–1.1)
GFR SERPL CREATININE-BSD FRML MDRD: >60 ML/MIN/{1.73_M2}
GLUCOSE BLD-MCNC: 81 MG/DL (ref 70–99)
MAGNESIUM: 2.1 MG/DL (ref 1.8–2.4)
POTASSIUM REFLEX MAGNESIUM: 3.6 MMOL/L (ref 3.5–5.1)
SODIUM BLD-SCNC: 134 MMOL/L (ref 136–145)

## 2023-03-05 PROCEDURE — 2580000003 HC RX 258: Performed by: PEDIATRICS

## 2023-03-05 PROCEDURE — 36415 COLL VENOUS BLD VENIPUNCTURE: CPT

## 2023-03-05 PROCEDURE — 6360000002 HC RX W HCPCS: Performed by: FAMILY MEDICINE

## 2023-03-05 PROCEDURE — 6370000000 HC RX 637 (ALT 250 FOR IP): Performed by: FAMILY MEDICINE

## 2023-03-05 PROCEDURE — 6370000000 HC RX 637 (ALT 250 FOR IP): Performed by: INTERNAL MEDICINE

## 2023-03-05 PROCEDURE — 6360000002 HC RX W HCPCS: Performed by: PEDIATRICS

## 2023-03-05 PROCEDURE — 6370000000 HC RX 637 (ALT 250 FOR IP): Performed by: PEDIATRICS

## 2023-03-05 PROCEDURE — 2060000000 HC ICU INTERMEDIATE R&B

## 2023-03-05 PROCEDURE — 6370000000 HC RX 637 (ALT 250 FOR IP): Performed by: NURSE PRACTITIONER

## 2023-03-05 PROCEDURE — 80048 BASIC METABOLIC PNL TOTAL CA: CPT

## 2023-03-05 PROCEDURE — 83735 ASSAY OF MAGNESIUM: CPT

## 2023-03-05 RX ADMIN — BICTEGRAVIR SODIUM, EMTRICITABINE, AND TENOFOVIR ALAFENAMIDE FUMARATE 1 TABLET: 50; 200; 25 TABLET ORAL at 09:57

## 2023-03-05 RX ADMIN — Medication 1000 UNITS: at 09:57

## 2023-03-05 RX ADMIN — Medication 10 ML: at 09:57

## 2023-03-05 RX ADMIN — POLYETHYLENE GLYCOL-3350 AND ELECTROLYTES 4000 ML: 236; 6.74; 5.86; 2.97; 22.74 POWDER, FOR SOLUTION ORAL at 16:24

## 2023-03-05 RX ADMIN — IRON SUCROSE 200 MG: 20 INJECTION, SOLUTION INTRAVENOUS at 17:45

## 2023-03-05 RX ADMIN — ACETAMINOPHEN 650 MG: 325 TABLET ORAL at 05:44

## 2023-03-05 RX ADMIN — Medication 10 ML: at 22:13

## 2023-03-05 RX ADMIN — ATORVASTATIN CALCIUM 10 MG: 10 TABLET, FILM COATED ORAL at 09:56

## 2023-03-05 RX ADMIN — SULFAMETHOXAZOLE AND TRIMETHOPRIM 1 TABLET: 800; 160 TABLET ORAL at 22:13

## 2023-03-05 RX ADMIN — Medication 3 MG: at 22:13

## 2023-03-05 RX ADMIN — ENOXAPARIN SODIUM 40 MG: 100 INJECTION SUBCUTANEOUS at 09:56

## 2023-03-05 RX ADMIN — SENNOSIDES AND DOCUSATE SODIUM 1 TABLET: 50; 8.6 TABLET ORAL at 09:56

## 2023-03-05 ASSESSMENT — PAIN SCALES - GENERAL
PAINLEVEL_OUTOF10: 0

## 2023-03-05 NOTE — PROGRESS NOTES
Patient resting in chair this morning w/o complaint. Patient denies nausea/ vomiting. Scheduled morning medications administered per orders, see eMAR for documentation. Patient tolerated pills whole w/ water w/o complication. Patient aware of clear liquid diet in place. Patient unhappy w/ diet, but reports understanding of importance of adhering to a clear liquid diet in preparation for EGD/ colonoscopy tomorrow. Bowel prep at the bedside and ordered to start this evening. This RN to administer later this shift. See eMAR for documentation. Head to toe assessment completed and charted. See flowsheets for documentation. Patient denies physical/emotional needs at this time. Call light, telephone, and bed side table are within reach. Fall precautions in place. Will continue to monitor and assess.

## 2023-03-05 NOTE — PROGRESS NOTES
Patient had 7 beats of vtach, notified dr Yaa Velazquez via perfect serve.  No new orders at this time

## 2023-03-05 NOTE — PROGRESS NOTES
Gastroenterology Progress Note    Sangeeta Loera is a 65 y.o. female patient. Principal Problem:    Liver metastases ([address]  Active Problems:    Dyspnea  Resolved Problems:    * No resolved hospital problems. *      SUBJECTIVE:  Had nausea improved with anti-emetics. Mild LUQ abdominal discomfort. No fevers. No hematochezia.     Current Facility-Administered Medications: polyethylene glycol (GLYCOLAX) packet 17 g, 17 g, Oral, TID  polyethylene glycol (GoLYTELY) solution 4,000 mL, 4,000 mL, Oral, Once  sennosides-docusate sodium (SENOKOT-S) 8.6-50 MG tablet 1 tablet, 1 tablet, Oral, Daily  potassium chloride (KLOR-CON) extended release tablet 40 mEq, 40 mEq, Oral, Once  oxyCODONE (ROXICODONE) immediate release tablet 5 mg, 5 mg, Oral, Q4H PRN  bictegravir-emtricitab-tenofovir alafenamide (BIKTARVY) -25 MG per tablet 1 tablet, 1 tablet, Oral, Daily  sulfamethoxazole-trimethoprim (BACTRIM DS;SEPTRA DS) 800-160 MG per tablet 1 tablet, 1 tablet, Oral, Nightly  Vitamin D (CHOLECALCIFEROL) tablet 1,000 Units, 1,000 Units, Oral, Daily  diphenhydrAMINE (BENADRYL) tablet 25 mg, 25 mg, Oral, Q6H PRN  atorvastatin (LIPITOR) tablet 10 mg, 10 mg, Oral, Daily  sodium chloride flush 0.9 % injection 5-40 mL, 5-40 mL, IntraVENous, 2 times per day  sodium chloride flush 0.9 % injection 5-40 mL, 5-40 mL, IntraVENous, PRN  0.9 % sodium chloride infusion, , IntraVENous, PRN  enoxaparin (LOVENOX) injection 40 mg, 40 mg, SubCUTAneous, Daily  ondansetron (ZOFRAN-ODT) disintegrating tablet 4 mg, 4 mg, Oral, Q8H PRN **OR** ondansetron (ZOFRAN) injection 4 mg, 4 mg, IntraVENous, Q6H PRN  acetaminophen (TYLENOL) tablet 650 mg, 650 mg, Oral, Q6H PRN **OR** acetaminophen (TYLENOL) suppository 650 mg, 650 mg, Rectal, Q6H PRN  melatonin tablet 3 mg, 3 mg, Oral, Nightly PRN    Physical    VITALS:  /81   Pulse (!) 104   Temp 99.5 °F (37.5 °C)   Resp 16   Ht 5' 4\" (1.626 m)   Wt 163 lb 2.3 oz (74 kg)   SpO2 96%   BMI 28.00 kg/m²   TEMPERATURE:  Current - Temp: 99.5 °F (37.5 °C); Max - Temp  Av.5 °F (36.9 °C)  Min: 97.5 °F (36.4 °C)  Max: 99.5 °F (37.5 °C)    NAD  Eyes: No icterus  RRR  Lungs CTA Bilaterally, normal effort  Abdomen soft, ND, NT, Bowel sounds normal.  Ext: no edema  Neuro: No tremor  Psych: A&Ox3    Data    Data Review:    Recent Labs     237 23  0505   WBC 10.4 10.6   HGB 9.7* 9.0*   HCT 29.9* 27.2*   MCV 80.2 78.6*   * 433     Recent Labs     23  1457 23  0505 23  0505    137 134*   K 4.6 2.8* 3.6    98* 97*   CO2 25 25 26   BUN 7 5* 5*   CREATININE 0.9 0.8 0.9     No results for input(s): AST, ALT, ALB, BILIDIR, BILITOT, ALKPHOS in the last 72 hours. No results for input(s): LIPASE, AMYLASE in the last 72 hours. No results for input(s): PROTIME, INR in the last 72 hours. No results for input(s): PTT in the last 72 hours. ASSESSMENT:   65 y.o. female with a PMH of HIV who presented on 3/2/2023 with dyspnea. CT chest showed multiple liver masses. Follow up MRI abd showed innumerable masses throughout the liver, in both the right and left hepatic lobes, which enhance on postcontrast images, most likely metastasis. Incompletely evaluated is questionable wall thickening of the colon in the region of the splenic flexure. IR liver biopsy ordered and pending. Patient has never had a colonoscopy. Brother  of colon cancer but is not sure what age she was diagnosed. She had had 30 to 40 pound weight loss over the past 1 to 2 months. Imp: Metastatic cancer unknown primary, suspect colon. Plan: EGD/colon Monday  I put in bowel prep for colonoscopy on Monday and NPO p mn on  night. Thank you for allowing me to participate in the care of your patient. Please feel free to contact me with any concerns.   [address] [facility], [place]

## 2023-03-05 NOTE — PLAN OF CARE
Problem: Discharge Planning  Goal: Discharge to home or other facility with appropriate resources  3/5/2023 1240 by Lyndsay Smart RN  Outcome: Progressing  Flowsheets (Taken 3/5/2023 1240)  Discharge to home or other facility with appropriate resources:   Identify barriers to discharge with patient and caregiver   Identify discharge learning needs (meds, wound care, etc)   Arrange for needed discharge resources and transportation as appropriate  3/4/2023 2326 by Misha Medina RN  Outcome: Progressing     Problem: Respiratory - Adult  Goal: Achieves optimal ventilation and oxygenation  3/5/2023 1240 by Lyndsay Smart RN  Outcome: Progressing  Flowsheets (Taken 3/5/2023 1240)  Achieves optimal ventilation and oxygenation:   Assess for changes in respiratory status   Assess for changes in mentation and behavior  3/4/2023 2326 by Misha Medina RN  Outcome: Progressing     Problem: Infection - Adult  Goal: Absence of infection at discharge  3/5/2023 1240 by Lyndsay Smart RN  Outcome: Progressing  Flowsheets (Taken 3/5/2023 1240)  Absence of infection at discharge:   Assess and monitor for signs and symptoms of infection   Monitor lab/diagnostic results   Administer medications as ordered  3/4/2023 2326 by Misha Median RN  Outcome: Progressing     Problem: Safety - Adult  Goal: Free from fall injury  3/5/2023 1240 by Lyndsay Smart RN  Outcome: Progressing  Flowsheets (Taken 3/5/2023 1240)  Free From Fall Injury: Instruct family/caregiver on patient safety  3/4/2023 2326 by Misha Medina RN  Outcome: Progressing  Flowsheets (Taken 3/4/2023 2200)  Free From Fall Injury: Instruct family/caregiver on patient safety     Problem: Pain  Goal: Verbalizes/displays adequate comfort level or baseline comfort level  3/5/2023 1240 by Lyndsay Smart RN  Outcome: Progressing  Flowsheets (Taken 3/5/2023 1240)  Verbalizes/displays adequate comfort level or baseline comfort level:   Encourage patient to monitor pain and request assistance   Assess pain using appropriate pain scale   Administer analgesics based on type and severity of pain and evaluate response   Consider cultural and social influences on pain and pain management   Implement non-pharmacological measures as appropriate and evaluate response   Notify Licensed Independent Practitioner if interventions unsuccessful or patient reports new pain  3/4/2023 2326 by Carolyn Villa RN  Outcome: Progressing

## 2023-03-05 NOTE — PLAN OF CARE
Problem: Discharge Planning  Goal: Discharge to home or other facility with appropriate resources  3/4/2023 2326 by Niurka Juarez RN  Outcome: Progressing  3/4/2023 1107 by Nam Vo RN  Outcome: Progressing     Problem: Respiratory - Adult  Goal: Achieves optimal ventilation and oxygenation  3/4/2023 2326 by Niurka Juarez RN  Outcome: Progressing  3/4/2023 1107 by Nam Vo RN  Outcome: Progressing     Problem: Infection - Adult  Goal: Absence of infection at discharge  3/4/2023 2326 by Niurka Juarez RN  Outcome: Progressing  3/4/2023 1107 by Nam Vo RN  Outcome: Progressing     Problem: Safety - Adult  Goal: Free from fall injury  3/4/2023 2326 by Niurka Juarez RN  Outcome: Progressing  Flowsheets (Taken 3/4/2023 2200)  Free From Fall Injury: Instruct family/caregiver on patient safety  3/4/2023 1107 by Nam Vo RN  Outcome: Progressing     Problem: Pain  Goal: Verbalizes/displays adequate comfort level or baseline comfort level  3/4/2023 2326 by Niurka Juarez RN  Outcome: Progressing  3/4/2023 1107 by Nam Vo RN  Outcome: Progressing  Flowsheets (Taken 3/4/2023 0820)  Verbalizes/displays adequate comfort level or baseline comfort level: Encourage patient to monitor pain and request assistance     Problem: Respiratory - Adult  Goal: Achieves optimal ventilation and oxygenation  3/4/2023 2326 by Niurka Juarez RN  Outcome: Progressing  3/4/2023 1107 by Nam Vo RN  Outcome: Progressing     Problem: Infection - Adult  Goal: Absence of infection at discharge  3/4/2023 2326 by Niurka Juarez RN  Outcome: Progressing  3/4/2023 1107 by Nam Vo RN  Outcome: Progressing     Problem: Safety - Adult  Goal: Free from fall injury  3/4/2023 2326 by Niurka Juarez RN  Outcome: Progressing  Flowsheets (Taken 3/4/2023 2200)  Free From Fall Injury: Instruct family/caregiver on patient safety  3/4/2023 1107 by Nam Vo RN  Outcome: Progressing

## 2023-03-06 ENCOUNTER — ANESTHESIA EVENT (OUTPATIENT)
Dept: ENDOSCOPY | Age: 58
End: 2023-03-06
Payer: MEDICAID

## 2023-03-06 ENCOUNTER — APPOINTMENT (OUTPATIENT)
Dept: CT IMAGING | Age: 58
DRG: 240 | End: 2023-03-06
Payer: MEDICAID

## 2023-03-06 ENCOUNTER — ANESTHESIA (OUTPATIENT)
Dept: ENDOSCOPY | Age: 58
End: 2023-03-06
Payer: MEDICAID

## 2023-03-06 LAB
A/G RATIO: 0.7 (ref 1.1–2.2)
ALBUMIN SERPL-MCNC: 2.3 G/DL (ref 3.4–5)
ALP BLD-CCNC: 374 U/L (ref 40–129)
ALT SERPL-CCNC: 34 U/L (ref 10–40)
ANION GAP SERPL CALCULATED.3IONS-SCNC: 14 MMOL/L (ref 3–16)
AST SERPL-CCNC: 131 U/L (ref 15–37)
BILIRUB SERPL-MCNC: 1.3 MG/DL (ref 0–1)
BUN BLDV-MCNC: 3 MG/DL (ref 7–20)
CALCIUM SERPL-MCNC: 8.6 MG/DL (ref 8.3–10.6)
CHLORIDE BLD-SCNC: 97 MMOL/L (ref 99–110)
CO2: 24 MMOL/L (ref 21–32)
CREAT SERPL-MCNC: 0.7 MG/DL (ref 0.6–1.1)
GFR SERPL CREATININE-BSD FRML MDRD: >60 ML/MIN/{1.73_M2}
GLUCOSE BLD-MCNC: 71 MG/DL (ref 70–99)
HCT VFR BLD CALC: 26.8 % (ref 36–48)
HEMOGLOBIN: 8.8 G/DL (ref 12–16)
INR BLD: 1.39 (ref 0.87–1.14)
MAGNESIUM: 2 MG/DL (ref 1.8–2.4)
MCH RBC QN AUTO: 25.9 PG (ref 26–34)
MCHC RBC AUTO-ENTMCNC: 32.8 G/DL (ref 31–36)
MCV RBC AUTO: 79 FL (ref 80–100)
PDW BLD-RTO: 22.2 % (ref 12.4–15.4)
PLATELET # BLD: 430 K/UL (ref 135–450)
PMV BLD AUTO: 8.1 FL (ref 5–10.5)
POTASSIUM REFLEX MAGNESIUM: 3.3 MMOL/L (ref 3.5–5.1)
PROTHROMBIN TIME: 17 SEC (ref 11.7–14.5)
RBC # BLD: 3.4 M/UL (ref 4–5.2)
SODIUM BLD-SCNC: 135 MMOL/L (ref 136–145)
TOTAL PROTEIN: 5.6 G/DL (ref 6.4–8.2)
WBC # BLD: 12.8 K/UL (ref 4–11)

## 2023-03-06 PROCEDURE — 2709999900 HC NON-CHARGEABLE SUPPLY: Performed by: INTERNAL MEDICINE

## 2023-03-06 PROCEDURE — 0DBK8ZZ EXCISION OF ASCENDING COLON, VIA NATURAL OR ARTIFICIAL OPENING ENDOSCOPIC: ICD-10-PCS | Performed by: INTERNAL MEDICINE

## 2023-03-06 PROCEDURE — 36415 COLL VENOUS BLD VENIPUNCTURE: CPT

## 2023-03-06 PROCEDURE — 3700000000 HC ANESTHESIA ATTENDED CARE: Performed by: INTERNAL MEDICINE

## 2023-03-06 PROCEDURE — 6360000002 HC RX W HCPCS: Performed by: INTERNAL MEDICINE

## 2023-03-06 PROCEDURE — 85610 PROTHROMBIN TIME: CPT

## 2023-03-06 PROCEDURE — 3609017100 HC EGD: Performed by: INTERNAL MEDICINE

## 2023-03-06 PROCEDURE — 2500000003 HC RX 250 WO HCPCS: Performed by: INTERNAL MEDICINE

## 2023-03-06 PROCEDURE — 7100000000 HC PACU RECOVERY - FIRST 15 MIN: Performed by: INTERNAL MEDICINE

## 2023-03-06 PROCEDURE — 2580000003 HC RX 258: Performed by: INTERNAL MEDICINE

## 2023-03-06 PROCEDURE — 94760 N-INVAS EAR/PLS OXIMETRY 1: CPT

## 2023-03-06 PROCEDURE — 0DBN8ZZ EXCISION OF SIGMOID COLON, VIA NATURAL OR ARTIFICIAL OPENING ENDOSCOPIC: ICD-10-PCS | Performed by: INTERNAL MEDICINE

## 2023-03-06 PROCEDURE — 80053 COMPREHEN METABOLIC PANEL: CPT

## 2023-03-06 PROCEDURE — 88305 TISSUE EXAM BY PATHOLOGIST: CPT

## 2023-03-06 PROCEDURE — 83735 ASSAY OF MAGNESIUM: CPT

## 2023-03-06 PROCEDURE — 6370000000 HC RX 637 (ALT 250 FOR IP): Performed by: FAMILY MEDICINE

## 2023-03-06 PROCEDURE — 3700000001 HC ADD 15 MINUTES (ANESTHESIA): Performed by: INTERNAL MEDICINE

## 2023-03-06 PROCEDURE — 3609019800 HC COLONOSCOPY WITH SUBMUCOSAL INJECTION: Performed by: INTERNAL MEDICINE

## 2023-03-06 PROCEDURE — 3609010600 HC COLONOSCOPY POLYPECTOMY SNARE/COLD BIOPSY: Performed by: INTERNAL MEDICINE

## 2023-03-06 PROCEDURE — 6370000000 HC RX 637 (ALT 250 FOR IP): Performed by: INTERNAL MEDICINE

## 2023-03-06 PROCEDURE — 7100000001 HC PACU RECOVERY - ADDTL 15 MIN: Performed by: INTERNAL MEDICINE

## 2023-03-06 PROCEDURE — 6360000002 HC RX W HCPCS: Performed by: PEDIATRICS

## 2023-03-06 PROCEDURE — 0DJ08ZZ INSPECTION OF UPPER INTESTINAL TRACT, VIA NATURAL OR ARTIFICIAL OPENING ENDOSCOPIC: ICD-10-PCS | Performed by: INTERNAL MEDICINE

## 2023-03-06 PROCEDURE — 2060000000 HC ICU INTERMEDIATE R&B

## 2023-03-06 PROCEDURE — 6360000002 HC RX W HCPCS: Performed by: NURSE ANESTHETIST, CERTIFIED REGISTERED

## 2023-03-06 PROCEDURE — 85027 COMPLETE CBC AUTOMATED: CPT

## 2023-03-06 PROCEDURE — 0DBN8ZX EXCISION OF SIGMOID COLON, VIA NATURAL OR ARTIFICIAL OPENING ENDOSCOPIC, DIAGNOSTIC: ICD-10-PCS | Performed by: INTERNAL MEDICINE

## 2023-03-06 PROCEDURE — 3609010300 HC COLONOSCOPY W/BIOPSY SINGLE/MULTIPLE: Performed by: INTERNAL MEDICINE

## 2023-03-06 PROCEDURE — 2580000003 HC RX 258: Performed by: NURSE ANESTHETIST, CERTIFIED REGISTERED

## 2023-03-06 PROCEDURE — 2500000003 HC RX 250 WO HCPCS: Performed by: NURSE ANESTHETIST, CERTIFIED REGISTERED

## 2023-03-06 RX ORDER — SODIUM CHLORIDE 9 MG/ML
INJECTION, SOLUTION INTRAVENOUS CONTINUOUS PRN
Status: DISCONTINUED | OUTPATIENT
Start: 2023-03-06 | End: 2023-03-06 | Stop reason: SDUPTHER

## 2023-03-06 RX ORDER — PROPOFOL 10 MG/ML
INJECTION, EMULSION INTRAVENOUS CONTINUOUS PRN
Status: DISCONTINUED | OUTPATIENT
Start: 2023-03-06 | End: 2023-03-06 | Stop reason: SDUPTHER

## 2023-03-06 RX ORDER — SODIUM CHLORIDE 0.9 % (FLUSH) 0.9 %
5-40 SYRINGE (ML) INJECTION EVERY 12 HOURS SCHEDULED
Status: DISCONTINUED | OUTPATIENT
Start: 2023-03-06 | End: 2023-03-08 | Stop reason: SDUPTHER

## 2023-03-06 RX ORDER — DIPHENHYDRAMINE HYDROCHLORIDE 50 MG/ML
12.5 INJECTION INTRAMUSCULAR; INTRAVENOUS
Status: ACTIVE | OUTPATIENT
Start: 2023-03-06 | End: 2023-03-07

## 2023-03-06 RX ORDER — ONDANSETRON 2 MG/ML
4 INJECTION INTRAMUSCULAR; INTRAVENOUS
Status: ACTIVE | OUTPATIENT
Start: 2023-03-06 | End: 2023-03-07

## 2023-03-06 RX ORDER — PROPOFOL 10 MG/ML
INJECTION, EMULSION INTRAVENOUS PRN
Status: DISCONTINUED | OUTPATIENT
Start: 2023-03-06 | End: 2023-03-06 | Stop reason: SDUPTHER

## 2023-03-06 RX ORDER — SODIUM CHLORIDE 0.9 % (FLUSH) 0.9 %
5-40 SYRINGE (ML) INJECTION PRN
Status: DISCONTINUED | OUTPATIENT
Start: 2023-03-06 | End: 2023-03-08 | Stop reason: SDUPTHER

## 2023-03-06 RX ORDER — SODIUM CHLORIDE 9 MG/ML
INJECTION, SOLUTION INTRAVENOUS PRN
Status: DISCONTINUED | OUTPATIENT
Start: 2023-03-06 | End: 2023-03-08 | Stop reason: SDUPTHER

## 2023-03-06 RX ORDER — LIDOCAINE HYDROCHLORIDE 20 MG/ML
INJECTION, SOLUTION EPIDURAL; INFILTRATION; INTRACAUDAL; PERINEURAL PRN
Status: DISCONTINUED | OUTPATIENT
Start: 2023-03-06 | End: 2023-03-06 | Stop reason: SDUPTHER

## 2023-03-06 RX ORDER — PANTOPRAZOLE SODIUM 40 MG/1
40 TABLET, DELAYED RELEASE ORAL
Status: DISCONTINUED | OUTPATIENT
Start: 2023-03-06 | End: 2023-03-08 | Stop reason: HOSPADM

## 2023-03-06 RX ORDER — GLYCOPYRROLATE 0.2 MG/ML
INJECTION INTRAMUSCULAR; INTRAVENOUS PRN
Status: DISCONTINUED | OUTPATIENT
Start: 2023-03-06 | End: 2023-03-06 | Stop reason: SDUPTHER

## 2023-03-06 RX ADMIN — PROPOFOL 180 MCG/KG/MIN: 10 INJECTION, EMULSION INTRAVENOUS at 14:22

## 2023-03-06 RX ADMIN — Medication 10 ML: at 19:59

## 2023-03-06 RX ADMIN — GLYCOPYRROLATE 0.1 MG: 0.2 INJECTION INTRAMUSCULAR; INTRAVENOUS at 14:19

## 2023-03-06 RX ADMIN — PROPOFOL 80 MG: 10 INJECTION, EMULSION INTRAVENOUS at 14:22

## 2023-03-06 RX ADMIN — ONDANSETRON 4 MG: 2 INJECTION INTRAMUSCULAR; INTRAVENOUS at 02:28

## 2023-03-06 RX ADMIN — LIDOCAINE HYDROCHLORIDE 80 MG: 20 INJECTION, SOLUTION EPIDURAL; INFILTRATION; INTRACAUDAL; PERINEURAL at 14:22

## 2023-03-06 RX ADMIN — ONDANSETRON 4 MG: 2 INJECTION INTRAMUSCULAR; INTRAVENOUS at 18:50

## 2023-03-06 RX ADMIN — SULFAMETHOXAZOLE AND TRIMETHOPRIM 1 TABLET: 800; 160 TABLET ORAL at 19:58

## 2023-03-06 RX ADMIN — PANTOPRAZOLE SODIUM 40 MG: 40 TABLET, DELAYED RELEASE ORAL at 18:04

## 2023-03-06 RX ADMIN — SODIUM CHLORIDE: 9 INJECTION, SOLUTION INTRAVENOUS at 14:19

## 2023-03-06 RX ADMIN — IRON SUCROSE 200 MG: 20 INJECTION, SOLUTION INTRAVENOUS at 16:00

## 2023-03-06 RX ADMIN — OXYCODONE 5 MG: 5 TABLET ORAL at 02:28

## 2023-03-06 RX ADMIN — OXYCODONE 5 MG: 5 TABLET ORAL at 20:01

## 2023-03-06 RX ADMIN — BICTEGRAVIR SODIUM, EMTRICITABINE, AND TENOFOVIR ALAFENAMIDE FUMARATE 1 TABLET: 50; 200; 25 TABLET ORAL at 19:33

## 2023-03-06 ASSESSMENT — PAIN DESCRIPTION - PAIN TYPE
TYPE: ACUTE PAIN
TYPE: ACUTE PAIN

## 2023-03-06 ASSESSMENT — PAIN - FUNCTIONAL ASSESSMENT
PAIN_FUNCTIONAL_ASSESSMENT: PREVENTS OR INTERFERES SOME ACTIVE ACTIVITIES AND ADLS
PAIN_FUNCTIONAL_ASSESSMENT: PREVENTS OR INTERFERES SOME ACTIVE ACTIVITIES AND ADLS

## 2023-03-06 ASSESSMENT — PAIN SCALES - GENERAL
PAINLEVEL_OUTOF10: 0
PAINLEVEL_OUTOF10: 7
PAINLEVEL_OUTOF10: 7
PAINLEVEL_OUTOF10: 0
PAINLEVEL_OUTOF10: 0

## 2023-03-06 ASSESSMENT — PAIN DESCRIPTION - LOCATION
LOCATION: ABDOMEN
LOCATION: ABDOMEN

## 2023-03-06 ASSESSMENT — PAIN DESCRIPTION - DESCRIPTORS
DESCRIPTORS: ACHING
DESCRIPTORS: CRAMPING

## 2023-03-06 ASSESSMENT — ENCOUNTER SYMPTOMS: SHORTNESS OF BREATH: 1

## 2023-03-06 ASSESSMENT — LIFESTYLE VARIABLES: SMOKING_STATUS: 0

## 2023-03-06 ASSESSMENT — PAIN DESCRIPTION - FREQUENCY
FREQUENCY: INTERMITTENT
FREQUENCY: CONTINUOUS

## 2023-03-06 ASSESSMENT — PAIN DESCRIPTION - ONSET
ONSET: ON-GOING
ONSET: GRADUAL

## 2023-03-06 NOTE — PROGRESS NOTES
Pt finished the Golytely at 0900. BSC emptied twice this morning. No formed pieces of stool seen. Pt has no complaints of nausea or pain at this time. Pt bed in lowest position and wheels are locked. Call light in place.

## 2023-03-06 NOTE — PROGRESS NOTES
Pt is off unit with transport to Endo. Pt took nose rings and ring off and is in sterile cup in room.

## 2023-03-06 NOTE — PROGRESS NOTES
Pt started to drink last 2 L of Golytely per order. BSC emptied. Stool mixed with urine so difficult to discern color of stool, but topher color noted in commode. No formed pieces of stool seen. No c/o pain or nausea at this time. Did have some nausea and abdominal cramping in the middle of the night. Pt was given Zofran and Oxycodone and states these meds were effective. Denies further needs. Will continue to monitor.

## 2023-03-06 NOTE — H&P
Pre-operative History and Physical    Patient: Cathy Frey  : 1965  Acct#:     Intended Procedure:  EGD and colonoscopy     HISTORY OF PRESENT ILLNESS:  The patient is a 65 y.o. female  who presents for/due to EGD and colonoscopy due to malignancy of unknown primary. Past Medical History:        Diagnosis Date    AIDS (acquired immune deficiency syndrome) ([facility] [address]      Past Surgical History:    History reviewed. No pertinent surgical history. Medications Prior to Admission:   No current facility-administered medications on file prior to encounter. Current Outpatient Medications on File Prior to Encounter   Medication Sig Dispense Refill    VENTOLIN  (90 Base) MCG/ACT inhaler Inhale 2 puffs into the lungs every 6 hours as needed for Wheezing      atorvastatin (LIPITOR) 10 MG tablet Take 1 tablet by mouth daily      BIKTARVY -25 MG TABS per tablet Take 1 tablet by mouth daily      vitamin D3 (CHOLECALCIFEROL) 25 MCG (1000 UT) TABS tablet Take 1 tablet by mouth daily      BANOPHEN 25 MG capsule Take 1 capsule by mouth every 6 hours as needed for Itching      sulfamethoxazole-trimethoprim (BACTRIM DS;SEPTRA DS) 800-160 MG per tablet Take 1 tablet by mouth daily          Allergies:  Patient has no known allergies. Social History:   TOBACCO:   reports that she has never smoked. She has never used smokeless tobacco.  ETOH:   reports that she does not currently use alcohol. DRUGS:   reports that she does not currently use drugs. PHYSICAL EXAM:      Vital Signs: /64   Pulse (!) 114   Temp 98.1 °F (36.7 °C) (Oral)   Resp 18   Ht 5' 4\" (1.626 m)   Wt 164 lb 0.4 oz (74.4 kg)   SpO2 97%   BMI 28.15 kg/m²    Airway: No stridor or wheezing noted. Good air movement  Pulmonary: without wheezes.   Clear to auscultation  Cardiac:regular rate and rhythm without loud murmurs  Abdomen:soft, nontender,  Bowel sounds present    Pre-Procedure Assessment / Plan:  1) Probable metastatic cancer    ASA Grade:  ASA 3 - Patient with moderate systemic disease with functional limitations  Mallampati Classification:  Class II    Level of Sedation Plan: Moderate sedation    Post Procedure plan: Return to same level of care    I assessed the patient and find that the patient is in satisfactory condition to proceed with the planned procedure and sedation plan. I have explained the risk, benefits, and alternatives to the procedure; the patient understands and agrees to proceed. The patient was counseled at length about the risks of bird Covid-19 during their perioperative period and any recovery window from their procedure. The patient was made aware that bird Covid-19  may worsen their prognosis for recovering from their procedure  and lend to a higher morbidity and/or mortality risk. All material risks, benefits, and reasonable alternatives including postponing the procedure were discussed. The patient does wish to proceed with the procedure at this time.       Marcella Harris MD  3/6/2023

## 2023-03-06 NOTE — ANESTHESIA POSTPROCEDURE EVALUATION
Department of Anesthesiology  Postprocedure Note    Patient: Russ Blue  MRN: [id number]  YOB: 1965  Date of evaluation: 3/6/2023      Procedure Summary     Date: 03/06/23 Room / Location: [address] [place] [address]    Anesthesia Start: 1419 Anesthesia Stop: 1501    Procedures:       ESOPHAGOGASTRODUODENOSCOPY      COLONOSCOPY POLYPECTOMY SNARE/COLD BIOPSY      COLONOSCOPY WITH BIOPSY      COLONOSCOPY SUBMUCOSAL TATTOO INJECTION Diagnosis:       Liver metastasis ([address]      (Liver Metastasis)    Surgeons: Bernard Chavis MD Responsible Provider: Clau Mayfield MD    Anesthesia Type: MAC ASA Status: 3          Anesthesia Type: No value filed.     Lisa Phase I: Lisa Score: 10    Lisa Phase II:        Anesthesia Post Evaluation    Patient location during evaluation: bedside  Patient participation: complete - patient participated  Level of consciousness: awake and alert  Pain score: 0  Nausea & Vomiting: no nausea  Complications: no  Cardiovascular status: hemodynamically stable  Respiratory status: acceptable  Hydration status: stable

## 2023-03-06 NOTE — CARE COORDINATION
Continue to follow patient for discharge needs. Per chart review, probable metastatic colon cancer. Patient to have EGD, colonoscopy and CT-guided liver biopsy today.     Electronically signed by JOSE ALEJANDRO Rojas, RUBY, Case Management on 3/6/2023 at 2:07 PM  [place] [phone]

## 2023-03-06 NOTE — PROGRESS NOTES
Pt arrived to pacu from endo asleep on side. VSS on monitor. O2 off pt breathes easy on RA. Ab soft. Pt remains asleep on left side.

## 2023-03-06 NOTE — PROGRESS NOTES
Pt back from procedure. Vital signs stable. Call light in place. Bed in lowest position and wheels locked. Bed alarm is on. Family at bedside.

## 2023-03-06 NOTE — PLAN OF CARE
Problem: Discharge Planning  Goal: Discharge to home or other facility with appropriate resources  3/6/2023 0739 by Hannah Montelongo  Outcome: Progressing  Flowsheets (Taken 3/5/2023 1956 by Ange Naylor RN)  Discharge to home or other facility with appropriate resources:   Identify barriers to discharge with patient and caregiver   Arrange for needed discharge resources and transportation as appropriate  3/5/2023 2007 by Ange Naylor RN  Outcome: Sherral Simple (Taken 3/5/2023 1956)  Discharge to home or other facility with appropriate resources:   Identify barriers to discharge with patient and caregiver   Arrange for needed discharge resources and transportation as appropriate     Problem: Respiratory - Adult  Goal: Achieves optimal ventilation and oxygenation  3/6/2023 0739 by Hannah Montelongo  Outcome: Progressing  Flowsheets (Taken 3/5/2023 1956 by Ange Naylor RN)  Achieves optimal ventilation and oxygenation:   Assess for changes in mentation and behavior   Position to facilitate oxygenation and minimize respiratory effort   Assess for changes in respiratory status  3/5/2023 2007 by Ange Naylor RN  Outcome: Progressing  Flowsheets (Taken 3/5/2023 1956)  Achieves optimal ventilation and oxygenation:   Assess for changes in mentation and behavior   Position to facilitate oxygenation and minimize respiratory effort   Assess for changes in respiratory status     Problem: Infection - Adult  Goal: Absence of infection at discharge  3/6/2023 0739 by Hannah Montelongo  Outcome: Progressing  Flowsheets (Taken 3/5/2023 1956 by Ange Naylor RN)  Absence of infection at discharge:   Assess and monitor for signs and symptoms of infection   Monitor lab/diagnostic results  3/5/2023 2007 by Ange Naylor RN  Outcome: Progressing  Flowsheets (Taken 3/5/2023 1956)  Absence of infection at discharge:   Assess and monitor for signs and symptoms of infection   Monitor lab/diagnostic results     Problem: Safety - Adult  Goal: Free from fall injury  3/6/2023 0739 by Arturo Muñiz  Outcome: Progressing  Flowsheets (Taken 3/5/2023 1240 by Ban Guy RN)  Free From Fall Injury: Instruct family/caregiver on patient safety  3/5/2023 2007 by Hanane Leon RN  Outcome: Progressing     Problem: Pain  Goal: Verbalizes/displays adequate comfort level or baseline comfort level  3/6/2023 0739 by Arturo Muñiz  Outcome: Progressing  Flowsheets (Taken 3/5/2023 2007 by Hanane Leon RN)  Verbalizes/displays adequate comfort level or baseline comfort level:   Encourage patient to monitor pain and request assistance   Assess pain using appropriate pain scale   Administer analgesics based on type and severity of pain and evaluate response   Implement non-pharmacological measures as appropriate and evaluate response  3/5/2023 2007 by Hanane Leon RN  Outcome: Progressing  Flowsheets (Taken 3/5/2023 2007)  Verbalizes/displays adequate comfort level or baseline comfort level:   Encourage patient to monitor pain and request assistance   Assess pain using appropriate pain scale   Administer analgesics based on type and severity of pain and evaluate response   Implement non-pharmacological measures as appropriate and evaluate response

## 2023-03-06 NOTE — PROGRESS NOTES
Surgical consent obtained for esophagogastroduodenoscopy and colonoscopy tomorrow w/ Dr. Lata Gastelum. Signed document placed into patient chart. Patient currently drinking bowel prep, Bms are brown and liquid. Patient denies needs at this time. Will continue to monitor and assess.

## 2023-03-06 NOTE — PROGRESS NOTES
Pt AAO x4. No c/o pain at this time. Pt drinking bowel prep for colonoscopy and EGD tomorrow. She is aware to be NPO after MN. Assessment completed and charted. Denies any needs. Call light in reach. Will monitor.

## 2023-03-06 NOTE — ANESTHESIA PRE PROCEDURE
Department of Anesthesiology  Preprocedure Note       Name:  Lily Cesar   Age:  65 y.o.  :  1965                                          MRN:  [id number]         Date:  3/6/2023      Surgeon: Dre Bridges):  Timbo Sal MD    Procedure: Procedure(s):  ESOPHAGOGASTRODUODENOSCOPY  COLONOSCOPY    Medications prior to admission:   Prior to Admission medications    Medication Sig Start Date End Date Taking?  Authorizing Provider   VENTOLIN  (90 Base) MCG/ACT inhaler Inhale 2 puffs into the lungs every 6 hours as needed for Wheezing 23   Historical Provider, MD   atorvastatin (LIPITOR) 10 MG tablet Take 1 tablet by mouth daily 23   Historical Provider, MD   BIKTARVY -25 MG TABS per tablet Take 1 tablet by mouth daily 23   Historical Provider, MD   vitamin D3 (CHOLECALCIFEROL) 25 MCG (1000 UT) TABS tablet Take 1 tablet by mouth daily 23   Historical Provider, MD   BANOPHEN 25 MG capsule Take 1 capsule by mouth every 6 hours as needed for Itching 23   Historical Provider, MD   sulfamethoxazole-trimethoprim (BACTRIM DS;SEPTRA DS) 800-160 MG per tablet Take 1 tablet by mouth daily 23   Historical Provider, MD       Current medications:    Current Facility-Administered Medications   Medication Dose Route Frequency Provider Last Rate Last Admin    iron sucrose (VENOFER) injection 200 mg  200 mg IntraVENous Q24H Faith DEVI Sloan, DO   200 mg at 23 1745    sennosides-docusate sodium (SENOKOT-S) 8.6-50 MG tablet 1 tablet  1 tablet Oral Daily Faith DEVI Hurst, DO   1 tablet at 23 0956    potassium chloride (KLOR-CON) extended release tablet 40 mEq  40 mEq Oral Once Faith R Hurst, DO        oxyCODONE (ROXICODONE) immediate release tablet 5 mg  5 mg Oral Q4H PRN Faith R Hurst, DO   5 mg at 23 0228    bictegravir-emtricitab-tenofovir alafenamide (BIKTARVY) -25 MG per tablet 1 tablet  1 tablet Oral Daily Amena Cote MD   1 tablet at 23 0957    sulfamethoxazole-trimethoprim (BACTRIM DS;SEPTRA DS) 800-160 MG per tablet 1 tablet  1 tablet Oral Nightly Chester Gill MD   1 tablet at 03/05/23 2213   • Vitamin D (CHOLECALCIFEROL) tablet 1,000 Units  1,000 Units Oral Daily Chester Gill MD   1,000 Units at 03/05/23 0957   • diphenhydrAMINE (BENADRYL) tablet 25 mg  25 mg Oral Q6H PRN Chester Gill MD       • atorvastatin (LIPITOR) tablet 10 mg  10 mg Oral Daily Chester Gill MD   10 mg at 03/05/23 0956   • sodium chloride flush 0.9 % injection 5-40 mL  5-40 mL IntraVENous 2 times per day Chester Gill MD   10 mL at 03/05/23 2213   • sodium chloride flush 0.9 % injection 5-40 mL  5-40 mL IntraVENous PRN Chester Gill MD       • 0.9 % sodium chloride infusion   IntraVENous PRN Chester Gill MD       • enoxaparin (LOVENOX) injection 40 mg  40 mg SubCUTAneous Daily Chester Gill MD   40 mg at 03/05/23 0956   • ondansetron (ZOFRAN-ODT) disintegrating tablet 4 mg  4 mg Oral Q8H PRN Chester Gill MD        Or   • ondansetron (ZOFRAN) injection 4 mg  4 mg IntraVENous Q6H PRN Chester Gill MD   4 mg at 03/06/23 0228   • acetaminophen (TYLENOL) tablet 650 mg  650 mg Oral Q6H PRN Chester Gill MD   650 mg at 03/05/23 0544    Or   • acetaminophen (TYLENOL) suppository 650 mg  650 mg Rectal Q6H PRN Chester Gill MD       • melatonin tablet 3 mg  3 mg Oral Nightly PRN XIN Guerrero - CNP   3 mg at 03/05/23 2213       Allergies:  No Known Allergies    Problem List:    Patient Active Problem List   Diagnosis Code   • Dyspnea R06.00   • Liver metastases (HCC) C78.7       Past Medical History:        Diagnosis Date   • AIDS (acquired immune deficiency syndrome) (HCC)        Past Surgical History:  History reviewed. No pertinent surgical history.    Social History:    Social History     Tobacco Use   • Smoking status: Never   • Smokeless tobacco: Never   Substance Use Topics   • Alcohol use: Not  Currently                                Counseling given: Not Answered      Vital Signs (Current):   Vitals:    03/06/23 0557 03/06/23 0830 03/06/23 0926 03/06/23 1359   BP: 131/75 111/64  139/87   Pulse: (!) 114 (!) 114  (!) 106   Resp: 20 18 17   Temp: 98 °F (36.7 °C) 98.1 °F (36.7 °C)  98.2 °F (36.8 °C)   TempSrc: Oral Oral     SpO2: 97% 97% 97% 98%   Weight:       Height:                                                  BP Readings from Last 3 Encounters:   03/06/23 139/87   01/13/23 (!) 144/77   07/09/20 123/70       NPO Status: Time of last liquid consumption: 0900                        Time of last solid consumption: 2100                        Date of last liquid consumption: 03/06/23                        Date of last solid food consumption: 03/05/23    BMI:   Wt Readings from Last 3 Encounters:   03/06/23 164 lb 0.4 oz (74.4 kg)   03/03/23 160 lb (72.6 kg)   01/13/23 178 lb 5.6 oz (80.9 kg)     Body mass index is 28.15 kg/m². CBC:   Lab Results   Component Value Date/Time    WBC 12.8 03/06/2023 04:58 AM    RBC 3.40 03/06/2023 04:58 AM    HGB 8.8 03/06/2023 04:58 AM    HCT 26.8 03/06/2023 04:58 AM    MCV 79.0 03/06/2023 04:58 AM    RDW 22.2 03/06/2023 04:58 AM     03/06/2023 04:58 AM       CMP:   Lab Results   Component Value Date/Time     03/06/2023 04:57 AM    K 3.3 03/06/2023 04:57 AM    CL 97 03/06/2023 04:57 AM    CO2 24 03/06/2023 04:57 AM    BUN 3 03/06/2023 04:57 AM    CREATININE 0.7 03/06/2023 04:57 AM    GFRAA >60 07/09/2020 05:11 PM    AGRATIO 0.7 03/06/2023 04:57 AM    LABGLOM >60 03/06/2023 04:57 AM    GLUCOSE 71 03/06/2023 04:57 AM    PROT 5.6 03/06/2023 04:57 AM    CALCIUM 8.6 03/06/2023 04:57 AM    BILITOT 1.3 03/06/2023 04:57 AM    ALKPHOS 374 03/06/2023 04:57 AM     03/06/2023 04:57 AM    ALT 34 03/06/2023 04:57 AM       POC Tests: No results for input(s): POCGLU, POCNA, POCK, POCCL, POCBUN, POCHEMO, POCHCT in the last 72 hours.     Coags:   Lab Results Component Value Date/Time    PROTIME 17.0 03/06/2023 08:16 AM    INR 1.39 03/06/2023 08:16 AM       HCG (If Applicable): No results found for: PREGTESTUR, PREGSERUM, HCG, HCGQUANT     ABGs: No results found for: PHART, PO2ART, BLO3JHO, HFQ1IDY, BEART, Q3IXQCWC     Type & Screen (If Applicable):  No results found for: LABABO, LABRH    Drug/Infectious Status (If Applicable):  No results found for: HIV, HEPCAB    COVID-19 Screening (If Applicable):   Lab Results   Component Value Date/Time    COVID19 Not Detected 01/13/2023 03:02 PM           Anesthesia Evaluation  Patient summary reviewed no history of anesthetic complications:   Airway: Mallampati: III  TM distance: >3 FB   Neck ROM: full  Mouth opening: > = 3 FB   Dental:    (+) poor dentition      Pulmonary:   (+) shortness of breath: new,      (-) not a current smoker                           Cardiovascular:    (+) hyperlipidemia    (-) past MI, CABG/stent and  angina          Echocardiogram reviewed         Beta Blocker:  Not on Beta Blocker      ROS comment: ECHO 3/2023: EF 55%     Neuro/Psych:      (-) seizures and CVA           GI/Hepatic/Renal:   (+) liver disease (liver metastasis):, bowel prep,      (-) no renal disease       Endo/Other:    (+) blood dyscrasia (Hgb 8.8): anemia:., .                  ROS comment: HIV on biktarvy   Abdominal:             Vascular: negative vascular ROS. Other Findings:           Anesthesia Plan      MAC     ASA 3       Induction: intravenous. Anesthetic plan and risks discussed with patient. Plan discussed with CRNA. This pre-anesthesia assessment may be used as a history and physical.    DOS STAFF ADDENDUM:    Pt seen and examined, chart reviewed (including anesthesia, drug and allergy history). No interval changes to history and physical examination. Anesthetic plan, risks, benefits, alternatives, and personnel involved discussed with patient.   Patient verbalized an understanding and agrees to proceed.       Yosef Villasenor MD  March 6, 2023  2:19 PM

## 2023-03-06 NOTE — PROGRESS NOTES
Hospitalist Progress Note    CC: Liver metastases ([facility] [address]      Admit date: 3/2/2023  Days in hospital:  4    Subjective/interval history: Pt was out of the room today when I went to see her. Will try again later. O2 status: room air    ROS:   Pertinent items are noted in HPI. .  Objective:    /64   Pulse (!) 114   Temp 98.1 °F (36.7 °C) (Oral)   Resp 18   Ht 5' 4\" (1.626 m)   Wt 164 lb 0.4 oz (74.4 kg)   SpO2 97%   BMI 28.15 kg/m²       Medications:  Scheduled Meds:   iron sucrose  200 mg IntraVENous Q24H    sennosides-docusate sodium  1 tablet Oral Daily    potassium chloride  40 mEq Oral Once    bictegravir-emtricitab-tenofovir alafenamide  1 tablet Oral Daily    sulfamethoxazole-trimethoprim  1 tablet Oral Nightly    Vitamin D  1,000 Units Oral Daily    atorvastatin  10 mg Oral Daily    sodium chloride flush  5-40 mL IntraVENous 2 times per day    enoxaparin  40 mg SubCUTAneous Daily       PRN Meds:  oxyCODONE, diphenhydrAMINE, sodium chloride flush, sodium chloride, ondansetron **OR** ondansetron, acetaminophen **OR** acetaminophen, melatonin    IV:   sodium chloride           Intake/Output Summary (Last 24 hours) at 3/6/2023 0917  Last data filed at 3/6/2023 0858  Gross per 24 hour   Intake 2550 ml   Output --   Net 2550 ml       Results:  CBC:   Recent Labs     03/06/23  0458   WBC 12.8*   HGB 8.8*   HCT 26.8*   MCV 79.0*        BMP:   Recent Labs     03/05/23  0505 03/06/23  0457   * 135*   K 3.6 3.3*   CL 97* 97*   CO2 26 24   BUN 5* 3*   CREATININE 0.9 0.7     Mag: No results for input(s): MAG in the last 72 hours. Phos: No results found for: PHOS  No results found for: GLU    LIVER PROFILE:   Recent Labs     03/06/23  0457   *   ALT 34   BILITOT 1.3*   ALKPHOS 374*     PT/INR:   Recent Labs     03/06/23  0816   PROTIME 17.0*   INR 1.39*     APTT: No results for input(s): APTT in the last 72 hours.   UA:No results for input(s): NITRITE, COLORU, PHUR, LABCAST, WBCUA, RBCUA, MUCUS, TRICHOMONAS, YEAST, BACTERIA, CLARITYU, SPECGRAV, LEUKOCYTESUR, UROBILINOGEN, BILIRUBINUR, BLOODU, GLUCOSEU, AMORPHOUS in the last 72 hours. Invalid input(s): Berto Naqvi input(s): ABG  Lab Results   Component Value Date    CALCIUM 8.6 03/06/2023       Assessment:    Principal Problem:    Liver metastases ([address]  Active Problems:    Dyspnea  Resolved Problems:    * No resolved hospital problems. [facility] course:  65 y.o. female with h/o hiv, copd, who presented to [facility] with dyspnea. She was found to have new liver lesions and hepatomegaly. Has hx of HIV - she thinks for > 20 years. She reports she isn't sure how she got it. Managed over at [address]. Denies hx of IVDA. She reports previously weighing about >200 lbs in 2022. Weight on presentation 160.     Plan:    Multiple liver masses:   Transaminitis   - noted on CT scan 3/2/23   - liver MRI with widespread mets and dewey wall thickening - possible colon primary  - AFP and ca 19-9 not elevated; cea 12K -> colon  - echo is reassuring, normal levf  - consulted IR for biopsy today  - consulted oncology  - consulted GI, egd, colonoscopy today  - d/w the Pt, she has no questions at this time    Anemia, faizan vs inflammation as ferritin is mildly elevated tibc is low but low Fe and  saturation - multifactorial   - replace with venofer    Chronic conditions - continue home meds unless otherwise stated  HIV:   - cont biktarvy -25   - bactrim po daily     Code status:  full  DVT prophylaxis: [x] Lovenox  [] SQ Heparin  [] SCDs  [] warfarin/oral direct thrombin inhibitor [] Encourage ambulation      Disposition:  [] Home [] Rehab [] Psych [] SNF  [] LTAC  [] Transfer to ICU  [] Transfer to PCU [] Other: in pt    Electronically signed by Ariel Crocker DO on 3/6/2023 at 9:17 AM

## 2023-03-06 NOTE — PLAN OF CARE
Problem: Discharge Planning  Goal: Discharge to home or other facility with appropriate resources  3/5/2023 2007 by Anum Watters RN  Outcome: Progressing  Flowsheets (Taken 3/5/2023 1956)  Discharge to home or other facility with appropriate resources:   Identify barriers to discharge with patient and caregiver   Arrange for needed discharge resources and transportation as appropriate     Problem: Respiratory - Adult  Goal: Achieves optimal ventilation and oxygenation  3/5/2023 2007 by Anum Watters RN  Outcome: Progressing  Flowsheets (Taken 3/5/2023 1956)  Achieves optimal ventilation and oxygenation:   Assess for changes in mentation and behavior   Position to facilitate oxygenation and minimize respiratory effort   Assess for changes in respiratory status     Problem: Infection - Adult  Goal: Absence of infection at discharge  3/5/2023 2007 by Anum Watters RN  Outcome: Progressing  Flowsheets (Taken 3/5/2023 1956)  Absence of infection at discharge:   Assess and monitor for signs and symptoms of infection   Monitor lab/diagnostic results     Problem: Safety - Adult  Goal: Free from fall injury  3/5/2023 2007 by Anum Watters RN  Outcome: Progressing     Problem: Pain  Goal: Verbalizes/displays adequate comfort level or baseline comfort level  3/5/2023 2007 by Anum Watters RN  Outcome: Progressing  [address] (Taken 3/5/2023 2007)  Verbalizes/displays adequate comfort level or baseline comfort level:   Encourage patient to monitor pain and request assistance   Assess pain using appropriate pain scale   Administer analgesics based on type and severity of pain and evaluate response   Implement non-pharmacological measures as appropriate and evaluate response

## 2023-03-06 NOTE — OP NOTE
Endoscopy Note    Patient: Marilyn Contreras  : 1965  Acct#:     Procedure: Esophagogastroduodenoscopy with no action                        Colonoscopy with biopsy, polypectomy (cold snare), tattoo injection, terminal ileum intubation     Date:  3/6/2023     Endoscopist:   Tory Hughes MD    Referring Physician:  Serjio Portillo. XIN Carter - CNP    Indications: The patient is a 65 y.o. female  who presents for/due to EGD and colonoscopy due to malignancy of unknown primary. Previous Colonoscopy: NO  Date: N/A  Greater than 3 years: N/A    Postoperative Diagnosis:    - LA grade D esophagitis   - 5 cm hiatal hernia   - 8mm sessile colon polyp in the proximal ascending colon removed with cold snare polypectomy   - 4mm sessile polyp in the sigmoid colon removed with cold snare polypectomy   - 5 cm nearly obstructive colon mass located between 35 to 40 cm. Multiple biopsies taken. Tattoo was placed at 50 cm, about 10 cm proximal to the colon mass, in 3 quadrants in 0.5 mL aliquots. Anesthesia:  Administered by the anesthesia service during MAC     Consent:  The patient or their legal guardian has signed a consent, and is aware of the potential risks, benefits, alternatives, and potential complications of this procedure. These include, but are not limited to hemorrhage, bleeding, post procedural pain, perforation, phlebitis, aspiration, hypotension, hypoxia, cardiovascular events such as arryhthmia, and possibly death. Additionally, the possibility of missed colonic polyps and interval colon cancer was discussed in the consent. Description of Procedure: The patient was then taken to the endoscopy suite, placed in the left lateral decubitus position and the above IV sedation was administrered. Upper Endoscopy procedure description  The Olympus video upper endoscope was placed through the patient's oropharynx without difficulty to the extent of the 2nd portion of the duodenum.   Both forward and retroflexed views of the stomach were obtained. Findings:    Esophagus: The esophagus appeared notable for LA grade D esophagitis and a 5cm hiatal hernia   The Z line was located at 34 cm, the GE junction at 34 cm, and the hiatus at 39 cm. Stomach: The stomach appeared normal on forward and retroflexed views    Duodenum: The first and 2nd portions of the duodenum appeared normal with normal villous pattern. The scope was then withdrawn back into the stomach, it was decompressed, and the scope was completely withdrawn. Colonoscopy procedure description  A digital rectal examination was performed and revealed negative without mass, lesions or tenderness. The patient was placed in the left lateral position and monitored continuously with ECG tracing, pulse oximetry monitoring and direct observations. Medications were administered incrementally over the course of the procedure to achieve an adequate level of conscious sedation. After digital examination of the rectum was performed, the Olympus PCF H190 was inserted into the rectum and advanced under direct vision to the cecum, which was identified by the appendiceal orifice and ileocecal valve . The procedure was considered more difficult than average. Additional maneuvers used to reach the cecum: abd pressure. Overall the patient tolerated the procedure well, without undue discomfort, hypotension or desaturation. The patient was adequately recovered in the endoscopy suite and was discharged to home. The preparation was excellent. During withdrawal examination, the final quality of the prep was [facility] Bowel Prep Scale: Right Colon: Grade 3 Transverse Colon: Grade 3 Left Colon: Grade 3     Additional rinsing and suctioning were not necessary to obtain adequate views. A careful inspection was made as the colonoscope was withdrawn. This did include a retroflexed evaluation of the rectum. Findings and interventions are described below. Appropriate photo documentation was obtained. Terminal ileum:     The scope was then withdrawn back through the cecum, ascending, transverse, descending, sigmoid colon, and rectum. Careful circumferential examination of the mucosa in these areas demonstrated:    - 8mm sessile colon polyp in the proximal ascending colon removed with cold snare polypectomy   - 4mm sessile polyp in the sigmoid colon removed with cold snare polypectomy   - 5 cm nearly obstructive colon mass located between 35 to 40 cm. Multiple biopsies taken. Tattoo was placed at 50 cm, about 10 cm proximal to the colon mass, in 3 quadrants in 0.5 mL aliquots. The scope was then withdrawn into the rectum and retroflexed. The retroflexed view of the anal verge and rectum demonstrates no abnormalities . The scope was straightened, the colon was decompressed and the scope was withdrawn from the patient. The patient tolerated the procedure well and was taken to the PACU in good condition. Estimated blood loss:  None    ID Type Source Tests Collected by Time Destination   A : ascending colon polyp, sigmoid polyp Tissue Colon SURGICAL PATHOLOGY Tory Hughes MD 3/6/2023 5033    B : biopsy sigmoid colon mass Tissue Colon SURGICAL PATHOLOGY Tory Hughes MD 3/6/2023 7756        Impression:    1) See post procedure diagnoses    Recommendations:   Return to floor   Follow up with oncology  Pathology pending   Continue bowel regimen   Start BID PPI due to esophagitis.      Tory Hughes MD  [address] and [address] [facility]

## 2023-03-06 NOTE — PROGRESS NOTES
ONCOLOGY HEMATOLOGY CARE PROGRESS NOTE      SUBJECTIVE:  Patient is feeling better today. She denies any shortness of breath or chest pain. ROS:     Constitutional:  No weight loss, No fever, No chills, No night sweats. Energy level good.   Eyes:  No impairment or change in vision  ENT / Mouth:  No pain, abnormal ulceration, bleeding, nasal drip or change in voice or hearing  Cardiovascular:  No chest pain, palpitations, new edema, or calf discomfort  Respiratory:  No pain, hemoptysis, change to breathing  Breast:  No pain, discharge, change in appearance or texture  Gastrointestinal:  No pain, cramping, jaundice, change to eating and bowel habits  Urinary:  No pain, bleeding or change in continence  Genitalia: No pain, bleeding or discharge  Musculoskeletal:  No redness, pain, edema or weakness  Skin:  No pruritus, rash, change to nodules or lesions  Neurologic:  No discomfort, change in mental status, speech, sensory or motor activity  Psychiatric:  No change in concentration or change to affect or mood  Endocrine:  No hot flashes, increased thirst, or change to urine production  Hematologic: No petechiae, ecchymosis or bleeding  Lymphatic:  No lymphadenopathy or lymphedema  Allergy / Immunologic:  No eczema, hives, frequent or recurrent infections    OBJECTIVE        Physical    VITALS:  Patient Vitals for the past 24 hrs:   BP Temp Temp src Pulse Resp SpO2 Weight   03/06/23 0557 131/75 98 °F (36.7 °C) Oral (!) 114 20 97 % --   03/06/23 0237 -- -- -- -- -- -- 164 lb 0.4 oz (74.4 kg)   03/06/23 0234 134/76 98.6 °F (37 °C) Oral (!) 116 20 97 % --   03/05/23 1903 (!) 145/53 98.3 °F (36.8 °C) Oral (!) 103 23 99 % --       24HR INTAKE/OUTPUT:    Intake/Output Summary (Last 24 hours) at 3/6/2023 6119  Last data filed at 3/5/2023 2215  Gross per 24 hour   Intake 2550 ml   Output --   Net 2550 ml       CONSTITUTIONAL: awake, alert, cooperative, no apparent distress   EYES: pupils equal, round and reactive to light, sclera clear and conjunctiva normal  ENT: Normocephalic, without obvious abnormality, atraumatic  NECK: supple, symmetrical, no jugular venous distension and no carotid bruits   HEMATOLOGIC/LYMPHATIC: no cervical, supraclavicular or axillary lymphadenopathy   LUNGS: no increased work of breathing and clear to auscultation   CARDIOVASCULAR: regular rate and rhythm, normal S1 and S2, no murmur noted  ABDOMEN: normal bowel sounds x 4, soft, non-distended, non-tender, no masses palpated, no hepatosplenomgaly   MUSCULOSKELETAL: full range of motion noted, tone is normal  NEUROLOGIC: awake, alert, oriented to name, place and time. Motor skills grossly intact. SKIN: Normal skin color, texture, turgor and no jaundice. appears intact   EXTREMITIES: no LE edema     DATA:  CBC:    Recent Labs     03/06/23 0458 03/03/23  0505 03/02/23  1457   WBC 12.8* 10.6 10.4   NEUTROABS  --  8.3* 8.1*   LYMPHOPCT  --  14.2 15.6   RBC 3.40* 3.46* 3.74*   HGB 8.8* 9.0* 9.7*   HCT 26.8* 27.2* 29.9*   MCV 79.0* 78.6* 80.2   MCH 25.9* 26.0 25.9*   MCHC 32.8 33.1 32.3   RDW 22.2* 21.5* 21.6*    433 498*       PT/INR:    Recent Labs     03/06/23 0457   PROT 5.6*     PTT:  No results for input(s): APTT in the last 720 hours. CMP:    Recent Labs     03/06/23 0457 03/05/23  0505 03/03/23  0505 03/02/23  1457   * 134* 137 139   K 3.3* 3.6 2.8* 4.6   CL 97* 97* 98* 100   CO2 24 26 25 25   GLUCOSE 71 81 104* 116*   BUN 3* 5* 5* 7   CREATININE 0.7 0.9 0.8 0.9   LABGLOM >60 >60 >60 >60   CALCIUM 8.6 8.5 8.4 8.7   PROT 5.6*  --   --   --    LABALBU 2.3*  --   --   --    AGRATIO 0.7*  --   --   --    BILITOT 1.3*  --   --   --    ALKPHOS 374*  --   --   --    ALT 34  --   --   --    *  --   --   --    MG 2.00 2.10 2.10  --        Lab Results   Component Value Date    CALCIUM 8.6 03/06/2023       LDH:No results for input(s): LDH in the last 720 hours.     Radiology Review:  Colonoscopy  No dictation   EGD  No dictation       Problem List  Patient Active Problem List   Diagnosis    Dyspnea    Liver metastases ([facility] [address]       ASSESSMENT AND PLAN:  1. Probable metastatic colon cancer. She is having an EGD and colonoscopy as well as a CT-guided liver biopsy today. She will need to see genetic counseling at some point since her brother  of colon cancer. I offered her to get in with a medical oncologist at [facility] since her her HIV doctor is there. She will likely stick with me but she has not made a final decision.             ONCOLOGIC DISPOSITION:      Michael Butler MD  Please contact through [facility]

## 2023-03-07 ENCOUNTER — APPOINTMENT (OUTPATIENT)
Dept: CT IMAGING | Age: 58
DRG: 240 | End: 2023-03-07
Payer: MEDICAID

## 2023-03-07 LAB
A/G RATIO: 0.7 (ref 1.1–2.2)
ALBUMIN SERPL-MCNC: 2.4 G/DL (ref 3.4–5)
ALP BLD-CCNC: 379 U/L (ref 40–129)
ALT SERPL-CCNC: 33 U/L (ref 10–40)
ANION GAP SERPL CALCULATED.3IONS-SCNC: 15 MMOL/L (ref 3–16)
AST SERPL-CCNC: 146 U/L (ref 15–37)
BILIRUB SERPL-MCNC: 1.2 MG/DL (ref 0–1)
BUN BLDV-MCNC: 5 MG/DL (ref 7–20)
CALCIUM SERPL-MCNC: 8.4 MG/DL (ref 8.3–10.6)
CHLORIDE BLD-SCNC: 99 MMOL/L (ref 99–110)
CO2: 24 MMOL/L (ref 21–32)
CREAT SERPL-MCNC: 0.8 MG/DL (ref 0.6–1.1)
GFR SERPL CREATININE-BSD FRML MDRD: >60 ML/MIN/{1.73_M2}
GLUCOSE BLD-MCNC: 92 MG/DL (ref 70–99)
HCT VFR BLD CALC: 27.5 % (ref 36–48)
HEMOGLOBIN: 8.8 G/DL (ref 12–16)
MAGNESIUM: 2 MG/DL (ref 1.8–2.4)
MCH RBC QN AUTO: 25.8 PG (ref 26–34)
MCHC RBC AUTO-ENTMCNC: 32 G/DL (ref 31–36)
MCV RBC AUTO: 80.6 FL (ref 80–100)
PDW BLD-RTO: 22.9 % (ref 12.4–15.4)
PLATELET # BLD: 408 K/UL (ref 135–450)
PMV BLD AUTO: 8.1 FL (ref 5–10.5)
POTASSIUM REFLEX MAGNESIUM: 3.4 MMOL/L (ref 3.5–5.1)
RBC # BLD: 3.42 M/UL (ref 4–5.2)
SODIUM BLD-SCNC: 138 MMOL/L (ref 136–145)
TOTAL PROTEIN: 5.7 G/DL (ref 6.4–8.2)
WBC # BLD: 13 K/UL (ref 4–11)

## 2023-03-07 PROCEDURE — 2709999900 CT NEEDLE BIOPSY LIVER PERCUTANEOUS

## 2023-03-07 PROCEDURE — 80053 COMPREHEN METABOLIC PANEL: CPT

## 2023-03-07 PROCEDURE — 0FB13ZX EXCISION OF RIGHT LOBE LIVER, PERCUTANEOUS APPROACH, DIAGNOSTIC: ICD-10-PCS | Performed by: STUDENT IN AN ORGANIZED HEALTH CARE EDUCATION/TRAINING PROGRAM

## 2023-03-07 PROCEDURE — 88307 TISSUE EXAM BY PATHOLOGIST: CPT

## 2023-03-07 PROCEDURE — 88342 IMHCHEM/IMCYTCHM 1ST ANTB: CPT

## 2023-03-07 PROCEDURE — 2060000000 HC ICU INTERMEDIATE R&B

## 2023-03-07 PROCEDURE — 83735 ASSAY OF MAGNESIUM: CPT

## 2023-03-07 PROCEDURE — 6370000000 HC RX 637 (ALT 250 FOR IP): Performed by: INTERNAL MEDICINE

## 2023-03-07 PROCEDURE — 88333 PATH CONSLTJ SURG CYTO XM 1: CPT

## 2023-03-07 PROCEDURE — 94760 N-INVAS EAR/PLS OXIMETRY 1: CPT

## 2023-03-07 PROCEDURE — 2580000003 HC RX 258: Performed by: INTERNAL MEDICINE

## 2023-03-07 PROCEDURE — 88341 IMHCHEM/IMCYTCHM EA ADD ANTB: CPT

## 2023-03-07 PROCEDURE — 6360000002 HC RX W HCPCS: Performed by: STUDENT IN AN ORGANIZED HEALTH CARE EDUCATION/TRAINING PROGRAM

## 2023-03-07 PROCEDURE — 77012 CT SCAN FOR NEEDLE BIOPSY: CPT

## 2023-03-07 PROCEDURE — 85027 COMPLETE CBC AUTOMATED: CPT

## 2023-03-07 PROCEDURE — 36415 COLL VENOUS BLD VENIPUNCTURE: CPT

## 2023-03-07 RX ORDER — MIDAZOLAM HYDROCHLORIDE 1 MG/ML
INJECTION INTRAMUSCULAR; INTRAVENOUS DAILY PRN
Status: COMPLETED | OUTPATIENT
Start: 2023-03-07 | End: 2023-03-07

## 2023-03-07 RX ORDER — FENTANYL CITRATE 50 UG/ML
INJECTION, SOLUTION INTRAMUSCULAR; INTRAVENOUS DAILY PRN
Status: COMPLETED | OUTPATIENT
Start: 2023-03-07 | End: 2023-03-07

## 2023-03-07 RX ADMIN — SULFAMETHOXAZOLE AND TRIMETHOPRIM 1 TABLET: 800; 160 TABLET ORAL at 21:03

## 2023-03-07 RX ADMIN — Medication 10 ML: at 21:03

## 2023-03-07 RX ADMIN — Medication 1000 UNITS: at 10:00

## 2023-03-07 RX ADMIN — SENNOSIDES AND DOCUSATE SODIUM 1 TABLET: 50; 8.6 TABLET ORAL at 09:58

## 2023-03-07 RX ADMIN — BICTEGRAVIR SODIUM, EMTRICITABINE, AND TENOFOVIR ALAFENAMIDE FUMARATE 1 TABLET: 50; 200; 25 TABLET ORAL at 09:58

## 2023-03-07 RX ADMIN — MIDAZOLAM 0.5 MG: 1 INJECTION INTRAMUSCULAR; INTRAVENOUS at 09:11

## 2023-03-07 RX ADMIN — PANTOPRAZOLE SODIUM 40 MG: 40 TABLET, DELAYED RELEASE ORAL at 16:58

## 2023-03-07 RX ADMIN — FENTANYL CITRATE 25 MCG: 50 INJECTION INTRAMUSCULAR; INTRAVENOUS at 09:11

## 2023-03-07 RX ADMIN — ATORVASTATIN CALCIUM 10 MG: 10 TABLET, FILM COATED ORAL at 09:58

## 2023-03-07 RX ADMIN — MIDAZOLAM 0.5 MG: 1 INJECTION INTRAMUSCULAR; INTRAVENOUS at 09:20

## 2023-03-07 RX ADMIN — OXYCODONE 5 MG: 5 TABLET ORAL at 17:00

## 2023-03-07 RX ADMIN — FENTANYL CITRATE 25 MCG: 50 INJECTION INTRAMUSCULAR; INTRAVENOUS at 09:19

## 2023-03-07 ASSESSMENT — PAIN DESCRIPTION - LOCATION: LOCATION: ABDOMEN

## 2023-03-07 ASSESSMENT — PAIN DESCRIPTION - ONSET: ONSET: ON-GOING

## 2023-03-07 ASSESSMENT — PAIN SCALES - GENERAL
PAINLEVEL_OUTOF10: 0
PAINLEVEL_OUTOF10: 0
PAINLEVEL_OUTOF10: 9

## 2023-03-07 ASSESSMENT — PAIN DESCRIPTION - DESCRIPTORS: DESCRIPTORS: ACHING

## 2023-03-07 ASSESSMENT — PAIN DESCRIPTION - PAIN TYPE: TYPE: ACUTE PAIN

## 2023-03-07 ASSESSMENT — PAIN DESCRIPTION - ORIENTATION: ORIENTATION: RIGHT;UPPER

## 2023-03-07 ASSESSMENT — PAIN DESCRIPTION - FREQUENCY: FREQUENCY: CONTINUOUS

## 2023-03-07 ASSESSMENT — PAIN - FUNCTIONAL ASSESSMENT: PAIN_FUNCTIONAL_ASSESSMENT: PREVENTS OR INTERFERES SOME ACTIVE ACTIVITIES AND ADLS

## 2023-03-07 NOTE — PROGRESS NOTES
The patient was seen collaboratively with Rosemary Francisco NP. I agree with her assessment and plan. The patient is staffed with Zohaib Valdovinos. Liver biopsy today. Plan for outpatient initiation of treatment with us or UC. Physical Exam:        CONSTITUTIONAL: awake, alert, cooperative, no apparent distress   EYES: pupils equal, round and reactive to light, sclera clear and conjunctiva normal  ENT: Normocephalic, without obvious abnormality, atraumatic  NECK: supple, symmetrical, no jugular venous distension and no carotid bruits   HEMATOLOGIC/LYMPHATIC: no cervical, supraclavicular or axillary lymphadenopathy   LUNGS: no increased work of breathing and clear to auscultation   CARDIOVASCULAR: regular rate and rhythm, normal S1 and S2, no murmur noted  ABDOMEN: normal bowel sounds x 4, soft, non-distended, non-tender, no masses palpated, no hepatosplenomgaly   MUSCULOSKELETAL: full range of motion noted, tone is normal  NEUROLOGIC: awake, alert, oriented to name, place and time. Motor skills grossly intact. SKIN: Normal skin color, texture, turgor and no jaundice.  appears intact   EXTREMITIES: no LE edema       [place] [facility], [facility], [address] ([id number])

## 2023-03-07 NOTE — PRE SEDATION
Sedation Pre-Procedure Note    Patient Name: Sharyle Bud   YOB: 1965  Room/Bed: Y4M-4039/3123-01  Medical Record Number: [id number]  Date: 3/7/2023   Time: 9:03 AM       Indication:  Liver lesion biopsy    Consent: I have discussed with the patient and/or the patient representative the indication, alternatives, and the possible risks and/or complications of the planned procedure and the anesthesia methods. The patient and/or patient representative appear to understand and agree to proceed. Vital Signs:   Vitals:    03/07/23 0821   BP:    Pulse:    Resp: 18   Temp:    SpO2: 92%       Past Medical History:   has a past medical history of AIDS (acquired immune deficiency syndrome) ([facility] [address] Past Surgical History:   has a past surgical history that includes Upper gastrointestinal endoscopy (N/A, 3/6/2023); Colonoscopy (N/A, 3/6/2023); Colonoscopy (3/6/2023); and Colonoscopy (3/6/2023). Medications:   Scheduled Meds:    sodium chloride flush  5-40 mL IntraVENous 2 times per day    pantoprazole  40 mg Oral BID AC    sennosides-docusate sodium  1 tablet Oral Daily    potassium chloride  40 mEq Oral Once    bictegravir-emtricitab-tenofovir alafenamide  1 tablet Oral Daily    sulfamethoxazole-trimethoprim  1 tablet Oral Nightly    Vitamin D  1,000 Units Oral Daily    atorvastatin  10 mg Oral Daily    sodium chloride flush  5-40 mL IntraVENous 2 times per day    enoxaparin  40 mg SubCUTAneous Daily     Continuous Infusions:    sodium chloride      sodium chloride       PRN Meds: sodium chloride flush, sodium chloride, ondansetron, diphenhydrAMINE, Benzocaine-Menthol, oxyCODONE, diphenhydrAMINE, sodium chloride flush, sodium chloride, ondansetron **OR** ondansetron, acetaminophen **OR** acetaminophen, melatonin  Home Meds:   Prior to Admission medications    Medication Sig Start Date End Date Taking?  Authorizing Provider   VENTOLIN  (90 Base) MCG/ACT inhaler Inhale 2 puffs into the lungs every 6 hours as needed for Wheezing 2/21/23   Historical Provider, MD   atorvastatin (LIPITOR) 10 MG tablet Take 1 tablet by mouth daily 2/20/23   Historical Provider, MD   BIKTARVY -25 MG TABS per tablet Take 1 tablet by mouth daily 2/21/23   Historical Provider, MD   vitamin D3 (CHOLECALCIFEROL) 25 MCG (1000 UT) TABS tablet Take 1 tablet by mouth daily 2/21/23   Historical Provider, MD   BANOPHEN 25 MG capsule Take 1 capsule by mouth every 6 hours as needed for Itching 2/20/23   Historical Provider, MD   sulfamethoxazole-trimethoprim (BACTRIM DS;SEPTRA DS) 800-160 MG per tablet Take 1 tablet by mouth daily 2/21/23   Historical Provider, MD     Coumadin Use Last 7 Days:  no  Antiplatelet drug therapy use last 7 days: no  Other anticoagulant use last 7 days: no  Additional Medication Information:  N/A      Pre-Sedation Documentation and Exam:   I have reviewed the patient's history and review of systems.     Mallampati Airway Assessment:  normal, Mallampati Class II - (soft palate, fauces & uvula are visible)    Prior History of Anesthesia Complications:   none    ASA Classification:  Class 2 - A normal healthy patient with mild systemic disease    Sedation/ Anesthesia Plan:   intravenous sedation    Medications Planned:   midazolam (Versed) intravenously and fentanyl intravenously    Patient is an appropriate candidate for plan of sedation: yes    Electronically signed by Tomeka Zhao DO on 3/7/2023 at 9:03 AM

## 2023-03-07 NOTE — PROGRESS NOTES
Pt is alert and oriented x4, resting quietly in bed. No complaints of nausea or vomiting at this time. Pt having some pain - PRN pain medications given as ordered. Pt NPO since midnight for biopsy today. No other needs made known at this time. Fall precautions in place. Call light within reach. Will continue to monitor.     Electronically signed by Conrad Washburn RN on 3/7/2023 at 2:52 AM

## 2023-03-07 NOTE — PLAN OF CARE
Problem: Discharge Planning  Goal: Discharge to home or other facility with appropriate resources  3/7/2023 0738 by Estela Howard RN  Outcome: Progressing  Flowsheets (Taken 3/7/2023 0252 by Annette Cosme RN)  Discharge to home or other facility with appropriate resources:   Identify barriers to discharge with patient and caregiver   Arrange for needed discharge resources and transportation as appropriate  3/7/2023 0252 by Annette Cosme RN  Outcome: Progressing  Flowsheets (Taken 3/7/2023 0252)  Discharge to home or other facility with appropriate resources:   Identify barriers to discharge with patient and caregiver   Arrange for needed discharge resources and transportation as appropriate     Problem: Respiratory - Adult  Goal: Achieves optimal ventilation and oxygenation  3/7/2023 0738 by Estela Howard RN  Outcome: Progressing  Flowsheets (Taken 3/7/2023 0252 by Annette Cosme RN)  Achieves optimal ventilation and oxygenation:   Assess for changes in respiratory status   Assess for changes in mentation and behavior   Position to facilitate oxygenation and minimize respiratory effort  3/7/2023 0252 by Annette Cosme RN  Outcome: Progressing  Flowsheets (Taken 3/7/2023 0252)  Achieves optimal ventilation and oxygenation:   Assess for changes in respiratory status   Assess for changes in mentation and behavior   Position to facilitate oxygenation and minimize respiratory effort     Problem: Infection - Adult  Goal: Absence of infection at discharge  3/7/2023 0738 by Estela Howard RN  Outcome: Progressing  Flowsheets (Taken 3/7/2023 0252 by Annette Cosme RN)  Absence of infection at discharge:   Assess and monitor for signs and symptoms of infection   Monitor lab/diagnostic results   Monitor all insertion sites i.e., indwelling lines, tubes and drains  3/7/2023 0252 by Annette Cosme RN  Outcome: Progressing  Flowsheets (Taken 3/7/2023 0252)  Absence of infection at discharge:   Assess and monitor for signs and symptoms of infection   Monitor lab/diagnostic results   Monitor all insertion sites i.e., indwelling lines, tubes and drains     Problem: Safety - Adult  Goal: Free from fall injury  3/7/2023 0738 by Renate Bernal RN  Outcome: Progressing  Flowsheets (Taken 3/7/2023 0252 by Angy Waters RN)  Free From Fall Injury: Instruct family/caregiver on patient safety  3/7/2023 0252 by Angy Waters RN  Outcome: Progressing  Flowsheets (Taken 3/7/2023 0252)  Free From Fall Injury: Instruct family/caregiver on patient safety     Problem: Pain  Goal: Verbalizes/displays adequate comfort level or baseline comfort level  3/7/2023 0738 by Renate Bernal RN  Outcome: Progressing  Flowsheets (Taken 3/7/2023 0252 by Angy Waters RN)  Verbalizes/displays adequate comfort level or baseline comfort level:   Encourage patient to monitor pain and request assistance   Assess pain using appropriate pain scale   Administer analgesics based on type and severity of pain and evaluate response   Implement non-pharmacological measures as appropriate and evaluate response  3/7/2023 0252 by Angy Waters RN  Outcome: Progressing  Flowsheets (Taken 3/7/2023 0252)  Verbalizes/displays adequate comfort level or baseline comfort level:   Encourage patient to monitor pain and request assistance   Assess pain using appropriate pain scale   Administer analgesics based on type and severity of pain and evaluate response   Implement non-pharmacological measures as appropriate and evaluate response

## 2023-03-07 NOTE — PROGRESS NOTES
Checking on patient Q2H for nutrition needs, hygiene needs, comfort measures, mobility, fall risk interventions, and safe environment. All precautions and interventions in place. Educated patient on use of call light and telephone. Patient verbalizes understanding. Call light/telephone in reach.   Electronically signed by Tori Salazar RN on 3/7/2023 at 7:39 AM

## 2023-03-07 NOTE — PROGRESS NOTES
Hospitalist Progress Note    CC: Liver metastases ([facility] [address]      Admit date: 3/2/2023  Days in hospital:  5    Subjective/interval history: patient seen at bedside, denied CP, SOB, N/V, alert and awake    ROS:   Pertinent items are noted in HPI. .  Objective:    /78   Pulse (!) 106   Temp 98.6 °F (37 °C) (Oral)   Resp 19   Ht 5' 4\" (1.626 m)   Wt 164 lb 10.9 oz (74.7 kg)   SpO2 98%   BMI 28.27 kg/m²       Medications:  Scheduled Meds:   sodium chloride flush  5-40 mL IntraVENous 2 times per day    pantoprazole  40 mg Oral BID AC    sennosides-docusate sodium  1 tablet Oral Daily    potassium chloride  40 mEq Oral Once    bictegravir-emtricitab-tenofovir alafenamide  1 tablet Oral Daily    sulfamethoxazole-trimethoprim  1 tablet Oral Nightly    Vitamin D  1,000 Units Oral Daily    atorvastatin  10 mg Oral Daily    sodium chloride flush  5-40 mL IntraVENous 2 times per day    enoxaparin  40 mg SubCUTAneous Daily       PRN Meds:  sodium chloride flush, sodium chloride, Benzocaine-Menthol, oxyCODONE, diphenhydrAMINE, sodium chloride flush, sodium chloride, ondansetron **OR** ondansetron, acetaminophen **OR** acetaminophen, melatonin    IV:   sodium chloride      sodium chloride           Intake/Output Summary (Last 24 hours) at 3/7/2023 1847  Last data filed at 3/7/2023 0931  Gross per 24 hour   Intake 390 ml   Output --   Net 390 ml       PE    General appearance: NAD  HEENT:  Conjunctivae/corneas clear. Neck: Supple, with full range of motion. Respiratory:  Normal respiratory effort. Clear to auscultation, bilaterally without Rales/Wheezes/Rhonchi. Cardiovascular: Regular rate and rhythm with normal S1/S2 without murmurs or rubs  Abdomen: Soft, non-tender, non-distended, normal bowel sounds. Musculoskeletal: No cyanosis or edema bilaterally  Neurologic:  without any focal sensory/motor deficits.  grossly non-focal.  Psychiatric: Alert and oriented, Normal mood  Peripheral Pulses: +2 palpable, equal bilaterally     Results:  CBC:   Recent Labs     03/06/23  0458 03/07/23  0518   WBC 12.8* 13.0*   HGB 8.8* 8.8*   HCT 26.8* 27.5*   MCV 79.0* 80.6    408       BMP:   Recent Labs     03/05/23  0505 03/06/23  0457 03/07/23  0518   * 135* 138   K 3.6 3.3* 3.4*   CL 97* 97* 99   CO2 26 24 24   BUN 5* 3* 5*   CREATININE 0.9 0.7 0.8       Mag: No results for input(s): MAG in the last 72 hours. Phos: No results found for: PHOS  No results found for: GLU    LIVER PROFILE:   Recent Labs     03/06/23 0457 03/07/23 0518   * 146*   ALT 34 33   BILITOT 1.3* 1.2*   ALKPHOS 374* 379*       PT/INR:   Recent Labs     03/06/23  0816   PROTIME 17.0*   INR 1.39*       APTT: No results for input(s): APTT in the last 72 hours. UA:No results for input(s): NITRITE, COLORU, PHUR, LABCAST, WBCUA, RBCUA, MUCUS, TRICHOMONAS, YEAST, BACTERIA, CLARITYU, SPECGRAV, LEUKOCYTESUR, UROBILINOGEN, BILIRUBINUR, BLOODU, GLUCOSEU, AMORPHOUS in the last 72 hours. Invalid input(s): Brannon Faye input(s): ABG  Lab Results   Component Value Date    CALCIUM 8.4 03/07/2023       Assessment:    Principal Problem:    Liver metastases ([address]  Active Problems:    Dyspnea  Resolved Problems:    * No resolved hospital problems. [facility] course:  65 y.o. female with h/o hiv, copd, who presented to [facility] with dyspnea. She was found to have new liver lesions and hepatomegaly. Has hx of HIV - she thinks for > 20 years. She reports she isn't sure how she got it. Managed over at [address]. Denies hx of IVDA. She reports previously weighing about >200 lbs in 2022. Weight on presentation 160.     Plan:    Multiple liver masses:   Transaminitis   - noted on CT scan 3/2/23   - liver MRI with widespread mets and dewey wall thickening - possible colon primary  - AFP and ca 19-9 not elevated; cea 12K -> colon  - echo is reassuring, normal levf  - consulted IR for biopsy today  - consulted oncology  - consulted GI, egd, colonoscopy - colon mass, oncology consulted, likely also has metastasis  - d/w the Pt, she has no questions at this time    Anemia, faizan vs inflammation as ferritin is mildly elevated tibc is low but low Fe and  saturation - multifactorial   - replace with venofer    Chronic conditions - continue home meds unless otherwise stated  HIV:   - cont biktarvy -25   - bactrim po daily      Disposition - likely dc tomorrow    Code status:  full  DVT prophylaxis: [x] Lovenox  [] SQ Heparin  [] SCDs  [] warfarin/oral direct thrombin inhibitor [] Encourage ambulation      Disposition:  [] Home [] Rehab [] Psych [] SNF  [] LTAC  [] Transfer to ICU  [] Transfer to PCU [] Other: in pt    Electronically signed by Maco Castro MD on 3/7/2023 at 6:47 PM

## 2023-03-07 NOTE — PROGRESS NOTES
INPATIENT PROGRESS NOTE        IDENTIFYING DATA/REASON FOR CONSULTATION   PATIENT:  Masha Barry  MRN:  [id number]  ADMIT DATE: 3/2/2023  TIME OF EVALUATION: 3/7/2023 9:40 AM  HOSPITAL STAY:   LOS: 5 days   CONSULTING PHYSICIAN: Darrel Arellano MD   REASON FOR CONSULTATION:    Subjective:    Patient seen in follow up  She has no complaints. She denies abd pain      MEDICATIONS   SCHEDULED:  sodium chloride flush, 5-40 mL, 2 times per day  pantoprazole, 40 mg, BID AC  sennosides-docusate sodium, 1 tablet, Daily  potassium chloride, 40 mEq, Once  bictegravir-emtricitab-tenofovir alafenamide, 1 tablet, Daily  sulfamethoxazole-trimethoprim, 1 tablet, Nightly  Vitamin D, 1,000 Units, Daily  atorvastatin, 10 mg, Daily  sodium chloride flush, 5-40 mL, 2 times per day  enoxaparin, 40 mg, Daily      FLUIDS/DRIPS:     sodium chloride      sodium chloride       PRNs: sodium chloride flush, 5-40 mL, PRN  sodium chloride, , PRN  ondansetron, 4 mg, Once PRN  diphenhydrAMINE, 12.5 mg, Once PRN  Benzocaine-Menthol, 1 lozenge, Q2H PRN  oxyCODONE, 5 mg, Q4H PRN  diphenhydrAMINE, 25 mg, Q6H PRN  sodium chloride flush, 5-40 mL, PRN  sodium chloride, , PRN  ondansetron, 4 mg, Q8H PRN   Or  ondansetron, 4 mg, Q6H PRN  acetaminophen, 650 mg, Q6H PRN   Or  acetaminophen, 650 mg, Q6H PRN  melatonin, 3 mg, Nightly PRN      ALLERGIES:  No Known Allergies      PHYSICAL EXAM   VITALS:  /78   Pulse (!) 106   Temp 98.6 °F (37 °C) (Oral)   Resp 19   Ht 5' 4\" (1.626 m)   Wt 164 lb 10.9 oz (74.7 kg)   SpO2 98%   BMI 28.27 kg/m²   TEMPERATURE:  Current - Temp: 98.6 °F (37 °C);  Max - Temp  Av.9 °F (36.6 °C)  Min: 97.4 °F (36.3 °C)  Max: 98.6 °F (37 °C)    Physical Exam:  General appearance: alert, cooperative, no distress, appears stated age  Eyes: Anicteric  Head: Normocephalic, without obvious abnormality  Lungs: clear to auscultation bilaterally, Normal Effort  Heart: regular rate and rhythm, normal S1 and S2, no murmurs or rubs  Abdomen: soft, non-distended, non-tender. Bowel sounds normal.   Extremities: atraumatic, no cyanosis or edema  Skin: warm and dry, no jaundice  Neuro: Grossly intact, A&OX3    LABS AND IMAGING   Laboratory   Recent Labs     03/06/23  0458 03/07/23  0518   WBC 12.8* 13.0*   HGB 8.8* 8.8*   HCT 26.8* 27.5*   MCV 79.0* 80.6    408     Recent Labs     03/05/23  0505 03/06/23  0457 03/07/23  0518   * 135* 138   K 3.6 3.3* 3.4*   CL 97* 97* 99   CO2 26 24 24   BUN 5* 3* 5*   CREATININE 0.9 0.7 0.8     Recent Labs     03/06/23 0457 03/07/23  0518   * 146*   ALT 34 33   BILITOT 1.3* 1.2*   ALKPHOS 374* 379*     No results for input(s): LIPASE, AMYLASE in the last 72 hours. Recent Labs     03/06/23  0816   PROTIME 17.0*   INR 1.39*         Imaging  MRI ABDOMEN W WO CONTRAST   Final Result   Widespread hepatic metastasis. Suspected wall thickening of the colon in the region of the splenic flexure. Consider colonoscopy to rule out colonic mass as a cause of the hepatic   metastasis         CT CHEST PULMONARY EMBOLISM W CONTRAST   Final Result   1. No evidence of pulmonary embolism or acute pulmonary abnormality. 2.  Multiple liver masses. 3.  Small hiatus hernia. XR CHEST (2 VW)   Final Result   No radiographic evidence of acute pulmonary disease. CT NEEDLE BIOPSY LIVER PERCUTANEOUS    (Results Pending)   CT GUIDED NEEDLE PLACEMENT    (Results Pending)       Endoscopy  EGD/colonoscopy 3/6/23 with Dr. Aida LE grade D esophagitis   - 5 cm hiatal hernia   - 8mm sessile colon polyp in the proximal ascending colon removed with cold snare polypectomy   - 4mm sessile polyp in the sigmoid colon removed with cold snare polypectomy   - 5 cm nearly obstructive colon mass located between 35 to 40 cm. Multiple biopsies taken. Tattoo was placed at 50 cm, about 10 cm proximal to the colon mass, in 3 quadrants in 0.5 mL aliquots.      ASSESSMENT AND RECOMMENDATIONS   Fany Olivas Keely De Luna is a 65 y.o. female with PMH of HIV who presented on 3/2/2023 with dyspnea. CT chest showed multiple liver masses. Follow up MRI abd showed innumerable masses throughout the liver, in both the right and left hepatic lobes, which enhance on postcontrast images, most likely metastasis and questionable wall thickening of the colon in the region of the splenic flexure. Colon mass s/p bx. Path report pending. CEA 12,526. She has family hx of colon cancer in her brother    Multiple liver lesions concerning for metastatic disease. IR guided biopsy scheduled for today    LA grade D esophagitis. Started on PPI BID    History of HIV on Biktarvy. Follows with ID at     RECOMMENDATIONS:    Follow up on path report  Diet as tolerated  PPI BID    If you have any questions or need any further information, please feel free to contact us [phone]. Thank you for allowing us to participate in the care of Marilyn Contreras. The note was completed using Dragon voice recognition transcription. Every effort was made to ensure accuracy; however, inadvertent transcription errors may be present despite my best efforts to edit errors. Jacque STONER    Attending physician addendum:    I have personally seen and examined the patient, reviewed the patient's medical record and pertinent labs and clinical imaging. I have personally staffed the case with Jacque STONER. I agree with her consultation note, exam findings, assessment and plans  as written above. I have made appropriate modifications and edited her assessment and plan where needed to reflect my impression and plans for this patient. Marilyn Contreras is a 65 y.o. female with PMH of HIV who presented on 3/2/2023 with dyspnea. CT chest showed multiple liver masses.   Follow up MRI abd showed innumerable masses throughout the liver, in both the right and left hepatic lobes, which enhance on postcontrast images, most likely metastasis and questionable wall thickening of the colon in the region of the splenic flexure. EGD/Colonoscopy completed on 3/6 with colon mass noted. Discussed results with patient. Recommend Oncology input. Will sign off. Thank you for allowing me to participate in this patient's care. If there are any questions or concerns regarding this patient, or the plan we have set in place, please feel free to contact me at [phone].      Shahbaz Polo MD

## 2023-03-07 NOTE — PLAN OF CARE
Problem: Discharge Planning  Goal: Discharge to home or other facility with appropriate resources  Outcome: Progressing  Flowsheets (Taken 3/7/2023 0252)  Discharge to home or other facility with appropriate resources:   Identify barriers to discharge with patient and caregiver   Arrange for needed discharge resources and transportation as appropriate     Problem: Respiratory - Adult  Goal: Achieves optimal ventilation and oxygenation  Outcome: Progressing  Flowsheets (Taken 3/7/2023 0252)  Achieves optimal ventilation and oxygenation:   Assess for changes in respiratory status   Assess for changes in mentation and behavior   Position to facilitate oxygenation and minimize respiratory effort     Problem: Infection - Adult  Goal: Absence of infection at discharge  Outcome: Progressing  Flowsheets (Taken 3/7/2023 0252)  Absence of infection at discharge:   Assess and monitor for signs and symptoms of infection   Monitor lab/diagnostic results   Monitor all insertion sites i.e., indwelling lines, tubes and drains     Problem: Safety - Adult  Goal: Free from fall injury  Outcome: Progressing  Flowsheets (Taken 3/7/2023 0252)  Free From Fall Injury: Instruct family/caregiver on patient safety     Problem: Pain  Goal: Verbalizes/displays adequate comfort level or baseline comfort level  Outcome: Progressing  Flowsheets (Taken 3/7/2023 0252)  Verbalizes/displays adequate comfort level or baseline comfort level:   Encourage patient to monitor pain and request assistance   Assess pain using appropriate pain scale   Administer analgesics based on type and severity of pain and evaluate response   Implement non-pharmacological measures as appropriate and evaluate response

## 2023-03-07 NOTE — BRIEF OP NOTE
Brief Postoperative Note    Archie Davis  YOB: 1965  [id number]      Pre-operative Diagnosis: Liver lesions    Post-operative Diagnosis: Same    Procedure: CT guided liver lesion biopsy    Anesthesia: Local and Moderate Sedation    Surgeons/Assistants: Linda Stanford DO    Estimated Blood Loss: less than 50     Complications: None    Specimens: Was Obtained: 3 cores    Findings:   Successful CT guided liver lesion biopsy. Post biopsy CT images demonstrated no hematoma formation. Cores found to be adequate for diagnosis by pathology.        Linda Stanford DO  3/7/2023, 9:31 AM  Interventional Radiology  [phone] ([phone])

## 2023-03-07 NOTE — PROGRESS NOTES
ONCOLOGY HEMATOLOGY CARE PROGRESS NOTE      SUBJECTIVE: feeling good today no pain, no sob, no bleeding. Going for liver biopsy later this morning. ROS:     Constitutional:  No weight loss, No fever, No chills, No night sweats.    Eyes:  No impairment or change in vision  ENT / Mouth:  No pain, abnormal ulceration, bleeding, nasal drip or change in voice or hearing  Cardiovascular:  No chest pain, palpitations, new edema, or calf discomfort  Respiratory:  No pain, hemoptysis, change to breathing  Breast:  No pain, discharge, change in appearance or texture  Gastrointestinal:  No pain, cramping, jaundice, change to eating and bowel habits  Urinary:  No pain, bleeding or change in continence  Genitalia: No pain, bleeding or discharge  Musculoskeletal:  No redness, pain, edema or weakness  Skin:  No pruritus, rash, change to nodules or lesions  Neurologic:  No discomfort, change in mental status, speech, sensory or motor activity  Psychiatric:  No change in concentration or change to affect or mood  Endocrine:  No hot flashes, increased thirst, or change to urine production  Hematologic: No petechiae, ecchymosis or bleeding  Lymphatic:  No lymphadenopathy or lymphedema  Allergy / Immunologic:  No eczema, hives, frequent or recurrent infections    OBJECTIVE        Physical    VITALS:  Patient Vitals for the past 24 hrs:   BP Temp Temp src Pulse Resp SpO2 Weight   03/07/23 0725 117/78 98.6 °F (37 °C) Oral (!) 116 18 91 % --   03/07/23 0650 -- -- -- -- -- -- 164 lb 10.9 oz (74.7 kg)   03/06/23 1926 110/76 97.7 °F (36.5 °C) Oral (!) 109 15 98 % --   03/06/23 1556 121/85 97.4 °F (36.3 °C) Oral 99 20 94 % --   03/06/23 1522 -- -- -- 100 26 95 % --   03/06/23 1515 131/83 -- -- (!) 102 24 95 % --   03/06/23 1510 130/78 -- -- 100 29 95 % --   03/06/23 1506 -- -- -- 100 23 94 % --   03/06/23 1505 118/73 -- -- 100 11 94 % --   03/06/23 1500 108/69 -- -- (!) 101 (!) 34 94 % --   03/06/23 1458 118/71 97.8 °F (36.6 °C) Temporal 99 22 95 % --   03/06/23 1359 139/87 98.2 °F (36.8 °C) -- (!) 106 17 98 % --   03/06/23 0926 -- -- -- -- -- 97 % --   03/06/23 0830 111/64 98.1 °F (36.7 °C) Oral (!) 114 18 97 % --       24HR INTAKE/OUTPUT:    Intake/Output Summary (Last 24 hours) at 3/7/2023 0758  Last data filed at 3/7/2023 0000  Gross per 24 hour   Intake 760 ml   Output --   Net 760 ml       CONSTITUTIONAL: awake, alert, cooperative, no apparent distress   EYES: pupils equal, round and reactive to light, sclera clear and conjunctiva normal  ENT: Normocephalic, without obvious abnormality, atraumatic  NECK: supple, symmetrical, no jugular venous distension and no carotid bruits   HEMATOLOGIC/LYMPHATIC: no cervical, supraclavicular or axillary lymphadenopathy   LUNGS: no increased work of breathing and clear to auscultation   CARDIOVASCULAR: regular rate and rhythm, normal S1 and S2, no murmur noted  ABDOMEN: normal bowel sounds x 4, soft, non-distended, non-tender, no masses palpated, no hepatosplenomgaly   MUSCULOSKELETAL: full range of motion noted, tone is normal  NEUROLOGIC: awake, alert, oriented to name, place and time. Motor skills grossly intact. SKIN: Normal skin color, texture, turgor and no jaundice. appears intact   EXTREMITIES: no LE edema     DATA:  CBC:    Recent Labs     03/07/23  0518 03/06/23  0458 03/03/23  0505 03/02/23  1457   WBC 13.0* 12.8* 10.6 10.4   NEUTROABS  --   --  8.3* 8.1*   LYMPHOPCT  --   --  14.2 15.6   RBC 3.42* 3.40* 3.46* 3.74*   HGB 8.8* 8.8* 9.0* 9.7*   HCT 27.5* 26.8* 27.2* 29.9*   MCV 80.6 79.0* 78.6* 80.2   MCH 25.8* 25.9* 26.0 25.9*   MCHC 32.0 32.8 33.1 32.3   RDW 22.9* 22.2* 21.5* 21.6*    430 433 498*       PT/INR:    Recent Labs     03/07/23  0518 03/06/23  0816 03/06/23  0457   PROT 5.7*  --  5.6*   INR  --  1.39*  --      PTT:  No results for input(s): APTT in the last 720 hours.     CMP:    Recent Labs     03/07/23 0518 03/06/23  0457 03/05/23  0505 03/03/23  0505    135* 134* 137   K 3.4* 3.3* 3.6 2.8*   CL 99 97* 97* 98*   CO2 24 24 26 25   GLUCOSE 92 71 81 104*   BUN 5* 3* 5* 5*   CREATININE 0.8 0.7 0.9 0.8   LABGLOM >60 >60 >60 >60   CALCIUM 8.4 8.6 8.5 8.4   PROT 5.7* 5.6*  --   --    LABALBU 2.4* 2.3*  --   --    AGRATIO 0.7* 0.7*  --   --    BILITOT 1.2* 1.3*  --   --    ALKPHOS 379* 374*  --   --    ALT 33 34  --   --    * 131*  --   --    MG 2.00 2.00 2.10 2.10       Lab Results   Component Value Date    CALCIUM 8.4 03/07/2023       LDH:No results for input(s): LDH in the last 720 hours.     Radiology Review:  Colonoscopy  No dictation   EGD  No dictation       Problem List  Patient Active Problem List   Diagnosis    Dyspnea    Liver metastases ([facility] [address]       ASSESSMENT AND PLAN:  Probable metastatic colon cancer  -5 cm nearly obstructing colon mass seen on colonoscopy, path pending--2 polyps also removed   -MRI shows concerning findings for liver metastasis    -liver biopsy scheduled for  later today  Anemia   -H&H stable today 8.8 & 27.5   -received IV iron 200 mg 3/5 3/6 and due today  HIV   -on HAART therapy   -follows ID at [address]:   would like to do palliative chemotherapy based on biopsy results  Patient still deciding if she wants to stay with  or us for treatment       XIN Ho - CNP  Please contact through [address]

## 2023-03-08 VITALS
HEART RATE: 105 BPM | BODY MASS INDEX: 28.83 KG/M2 | DIASTOLIC BLOOD PRESSURE: 83 MMHG | TEMPERATURE: 98.5 F | HEIGHT: 64 IN | RESPIRATION RATE: 18 BRPM | WEIGHT: 168.87 LBS | OXYGEN SATURATION: 95 % | SYSTOLIC BLOOD PRESSURE: 126 MMHG

## 2023-03-08 LAB
A/G RATIO: 0.6 (ref 1.1–2.2)
ALBUMIN SERPL-MCNC: 2.1 G/DL (ref 3.4–5)
ALP BLD-CCNC: 358 U/L (ref 40–129)
ALT SERPL-CCNC: 33 U/L (ref 10–40)
ANION GAP SERPL CALCULATED.3IONS-SCNC: 11 MMOL/L (ref 3–16)
AST SERPL-CCNC: 151 U/L (ref 15–37)
BILIRUB SERPL-MCNC: 1.3 MG/DL (ref 0–1)
BUN BLDV-MCNC: 5 MG/DL (ref 7–20)
CALCIUM SERPL-MCNC: 8.4 MG/DL (ref 8.3–10.6)
CHLORIDE BLD-SCNC: 98 MMOL/L (ref 99–110)
CO2: 27 MMOL/L (ref 21–32)
CREAT SERPL-MCNC: 0.7 MG/DL (ref 0.6–1.1)
GFR SERPL CREATININE-BSD FRML MDRD: >60 ML/MIN/{1.73_M2}
GLUCOSE BLD-MCNC: 104 MG/DL (ref 70–99)
HCT VFR BLD CALC: 26.9 % (ref 36–48)
HEMOGLOBIN: 8.7 G/DL (ref 12–16)
MAGNESIUM: 2.1 MG/DL (ref 1.8–2.4)
MCH RBC QN AUTO: 26.3 PG (ref 26–34)
MCHC RBC AUTO-ENTMCNC: 32.4 G/DL (ref 31–36)
MCV RBC AUTO: 81.2 FL (ref 80–100)
PDW BLD-RTO: 23.6 % (ref 12.4–15.4)
PLATELET # BLD: 381 K/UL (ref 135–450)
PMV BLD AUTO: 8.2 FL (ref 5–10.5)
POTASSIUM REFLEX MAGNESIUM: 3.4 MMOL/L (ref 3.5–5.1)
RBC # BLD: 3.32 M/UL (ref 4–5.2)
REASON FOR REJECTION: NORMAL
REJECTED TEST: NORMAL
SODIUM BLD-SCNC: 136 MMOL/L (ref 136–145)
TOTAL PROTEIN: 5.5 G/DL (ref 6.4–8.2)
WBC # BLD: 11.7 K/UL (ref 4–11)

## 2023-03-08 PROCEDURE — 94760 N-INVAS EAR/PLS OXIMETRY 1: CPT

## 2023-03-08 PROCEDURE — 6370000000 HC RX 637 (ALT 250 FOR IP): Performed by: INTERNAL MEDICINE

## 2023-03-08 PROCEDURE — 36415 COLL VENOUS BLD VENIPUNCTURE: CPT

## 2023-03-08 PROCEDURE — 83735 ASSAY OF MAGNESIUM: CPT

## 2023-03-08 PROCEDURE — 2580000003 HC RX 258: Performed by: ANESTHESIOLOGY

## 2023-03-08 PROCEDURE — 85027 COMPLETE CBC AUTOMATED: CPT

## 2023-03-08 PROCEDURE — 6360000002 HC RX W HCPCS: Performed by: INTERNAL MEDICINE

## 2023-03-08 PROCEDURE — 80053 COMPREHEN METABOLIC PANEL: CPT

## 2023-03-08 RX ORDER — FERROUS SULFATE 325(65) MG
325 TABLET ORAL 2 TIMES DAILY
Qty: 60 TABLET | Refills: 1 | Status: SHIPPED | OUTPATIENT
Start: 2023-03-08 | End: 2023-05-07

## 2023-03-08 RX ORDER — LANOLIN ALCOHOL/MO/W.PET/CERES
3 CREAM (GRAM) TOPICAL NIGHTLY PRN
Qty: 10 TABLET | Refills: 0 | Status: SHIPPED | OUTPATIENT
Start: 2023-03-08 | End: 2023-03-18

## 2023-03-08 RX ORDER — PANTOPRAZOLE SODIUM 40 MG/1
40 TABLET, DELAYED RELEASE ORAL
Qty: 30 TABLET | Refills: 3 | Status: SHIPPED | OUTPATIENT
Start: 2023-03-08

## 2023-03-08 RX ORDER — OXYCODONE HYDROCHLORIDE 5 MG/1
5 TABLET ORAL EVERY 8 HOURS PRN
Qty: 9 TABLET | Refills: 0 | Status: SHIPPED | OUTPATIENT
Start: 2023-03-08 | End: 2023-03-11

## 2023-03-08 RX ORDER — SENNA AND DOCUSATE SODIUM 50; 8.6 MG/1; MG/1
1 TABLET, FILM COATED ORAL DAILY
Qty: 10 TABLET | Refills: 0 | Status: SHIPPED | OUTPATIENT
Start: 2023-03-09 | End: 2023-03-19

## 2023-03-08 RX ADMIN — OXYCODONE 5 MG: 5 TABLET ORAL at 09:21

## 2023-03-08 RX ADMIN — Medication 10 ML: at 09:22

## 2023-03-08 RX ADMIN — ENOXAPARIN SODIUM 40 MG: 100 INJECTION SUBCUTANEOUS at 09:22

## 2023-03-08 RX ADMIN — ATORVASTATIN CALCIUM 10 MG: 10 TABLET, FILM COATED ORAL at 09:22

## 2023-03-08 RX ADMIN — Medication 1000 UNITS: at 09:22

## 2023-03-08 RX ADMIN — SENNOSIDES AND DOCUSATE SODIUM 1 TABLET: 50; 8.6 TABLET ORAL at 09:22

## 2023-03-08 RX ADMIN — PANTOPRAZOLE SODIUM 40 MG: 40 TABLET, DELAYED RELEASE ORAL at 05:26

## 2023-03-08 RX ADMIN — BICTEGRAVIR SODIUM, EMTRICITABINE, AND TENOFOVIR ALAFENAMIDE FUMARATE 1 TABLET: 50; 200; 25 TABLET ORAL at 09:21

## 2023-03-08 ASSESSMENT — PAIN DESCRIPTION - DESCRIPTORS: DESCRIPTORS: ACHING

## 2023-03-08 ASSESSMENT — PAIN DESCRIPTION - PAIN TYPE: TYPE: ACUTE PAIN

## 2023-03-08 ASSESSMENT — PAIN SCALES - GENERAL
PAINLEVEL_OUTOF10: 8
PAINLEVEL_OUTOF10: 0

## 2023-03-08 ASSESSMENT — PAIN DESCRIPTION - LOCATION: LOCATION: ABDOMEN

## 2023-03-08 ASSESSMENT — PAIN - FUNCTIONAL ASSESSMENT: PAIN_FUNCTIONAL_ASSESSMENT: PREVENTS OR INTERFERES SOME ACTIVE ACTIVITIES AND ADLS

## 2023-03-08 ASSESSMENT — PAIN DESCRIPTION - ORIENTATION: ORIENTATION: RIGHT;UPPER

## 2023-03-08 ASSESSMENT — PAIN DESCRIPTION - ONSET: ONSET: ON-GOING

## 2023-03-08 ASSESSMENT — PAIN DESCRIPTION - FREQUENCY: FREQUENCY: CONTINUOUS

## 2023-03-08 NOTE — PROGRESS NOTES
Data- discharge order received, pt verbalized agreement to discharge, disposition to previous residence, no needs for HHC/DME. Action- discharge instructions prepared and given to patient, pt verbalized understanding. Medication information packet given r/t NEW and/or CHANGED prescriptions emphasizing name/purpose/side effects, pt verbalized understanding. Discharge instruction summary: Diet- general, Activity- as tolerated, Primary Care Physician as followsCharlette Alert. [address], [address] [facility] f/u appointment scheduled by patient, immunizations reviewed and up to date, prescription medications filled at retail pharmacy. Response- Pt belongings gathered, IV removed. Disposition is home (no HHC/DME needs), transported with belongings, taken to lobby via w/c w/ PCA, no complications. Daughter transporting home. Patient attempted to call  Oncology office to resume care with UC. Left message. Had not received call back. Office number given to Ailyn Castro, social work, to attempt to call for patient to make appointment. Cell phone number provided to Ailyn Castro to reach patient after discharge.

## 2023-03-08 NOTE — PLAN OF CARE
Problem: Discharge Planning  Goal: Discharge to home or other facility with appropriate resources  3/8/2023 0815 by Teresa Ortiz RN  Outcome: Progressing  Flowsheets (Taken 3/8/2023 0815)  Discharge to home or other facility with appropriate resources:   Identify barriers to discharge with patient and caregiver   Arrange for needed discharge resources and transportation as appropriate     Problem: Respiratory - Adult  Goal: Achieves optimal ventilation and oxygenation  3/8/2023 0815 by Teresa Ortiz RN  Outcome: Progressing  Flowsheets (Taken 3/8/2023 0815)  Achieves optimal ventilation and oxygenation:   Assess for changes in respiratory status   Assess for changes in mentation and behavior     Problem: Infection - Adult  Goal: Absence of infection at discharge  3/8/2023 0815 by Teresa Ortiz RN  Outcome: Progressing  Flowsheets (Taken 3/8/2023 0815)  Absence of infection at discharge:   Assess and monitor for signs and symptoms of infection   Monitor lab/diagnostic results   Monitor all insertion sites i.e., indwelling lines, tubes and drains     Problem: Safety - Adult  Goal: Free from fall injury  3/8/2023 0815 by Teresa Ortiz RN  Outcome: Progressing  Flowsheets (Taken 3/8/2023 0815)  Free From Fall Injury: Instruct family/caregiver on patient safety     Problem: Pain  Goal: Verbalizes/displays adequate comfort level or baseline comfort level  3/8/2023 0815 by Teresa Ortiz RN  Outcome: Progressing  Flowsheets (Taken 3/8/2023 0815)  Verbalizes/displays adequate comfort level or baseline comfort level:   Encourage patient to monitor pain and request assistance   Assess pain using appropriate pain scale   Administer analgesics based on type and severity of pain and evaluate response     Problem: ABCDS Injury Assessment  Goal: Absence of physical injury  Outcome: Progressing

## 2023-03-08 NOTE — PROGRESS NOTES
Pt is alert and oriented x4, resting quietly in bed. Nightly medications and intake tolerated well. No complaints of nausea, vomiting, or pain at this time. No other needs made known at this time. Fall precautions in place. Call light within reach. Will continue to monitor.     Electronically signed by Rajeev Mendoza RN on 3/8/2023 at 2:42 AM

## 2023-03-08 NOTE — PROGRESS NOTES
Patient resting quietly in bed. Alert and oriented. Ambulates independently. Breakfast eaten. VSS. Morning medications given without difficulty. Oxycodone given for 8/10 abdominal pain. Call light and belongings within reach. Will monitor.      Electronically signed by Sandy Alonzo RN on 3/8/2023 at 9:32 AM

## 2023-03-08 NOTE — PLAN OF CARE
Problem: Discharge Planning  Goal: Discharge to home or other facility with appropriate resources  Outcome: Progressing  Flowsheets (Taken 3/8/2023 0243)  Discharge to home or other facility with appropriate resources:   Identify barriers to discharge with patient and caregiver   Arrange for needed discharge resources and transportation as appropriate     Problem: Respiratory - Adult  Goal: Achieves optimal ventilation and oxygenation  Outcome: Progressing  Flowsheets (Taken 3/8/2023 0243)  Achieves optimal ventilation and oxygenation:   Assess for changes in respiratory status   Assess for changes in mentation and behavior   Position to facilitate oxygenation and minimize respiratory effort     Problem: Infection - Adult  Goal: Absence of infection at discharge  Outcome: Progressing  Flowsheets (Taken 3/8/2023 0243)  Absence of infection at discharge:   Assess and monitor for signs and symptoms of infection   Monitor lab/diagnostic results   Monitor all insertion sites i.e., indwelling lines, tubes and drains     Problem: Safety - Adult  Goal: Free from fall injury  Outcome: Progressing  Flowsheets (Taken 3/8/2023 0243)  Free From Fall Injury: Instruct family/caregiver on patient safety     Problem: Pain  Goal: Verbalizes/displays adequate comfort level or baseline comfort level  Outcome: Progressing  Flowsheets (Taken 3/8/2023 0243)  Verbalizes/displays adequate comfort level or baseline comfort level:   Encourage patient to monitor pain and request assistance   Assess pain using appropriate pain scale   Administer analgesics based on type and severity of pain and evaluate response   Implement non-pharmacological measures as appropriate and evaluate response

## 2023-03-08 NOTE — PROGRESS NOTES
ONCOLOGY HEMATOLOGY CARE PROGRESS NOTE      SUBJECTIVE:  Patient doing reasonably well. She denies any shortness of breath or chest pain. ROS:     Constitutional:  No weight loss, No fever, No chills, No night sweats. Energy level good.   Eyes:  No impairment or change in vision  ENT / Mouth:  No pain, abnormal ulceration, bleeding, nasal drip or change in voice or hearing  Cardiovascular:  No chest pain, palpitations, new edema, or calf discomfort  Respiratory:  No pain, hemoptysis, change to breathing  Breast:  No pain, discharge, change in appearance or texture  Gastrointestinal:  No pain, cramping, jaundice, change to eating and bowel habits  Urinary:  No pain, bleeding or change in continence  Genitalia: No pain, bleeding or discharge  Musculoskeletal:  No redness, pain, edema or weakness  Skin:  No pruritus, rash, change to nodules or lesions  Neurologic:  No discomfort, change in mental status, speech, sensory or motor activity  Psychiatric:  No change in concentration or change to affect or mood  Endocrine:  No hot flashes, increased thirst, or change to urine production  Hematologic: No petechiae, ecchymosis or bleeding  Lymphatic:  No lymphadenopathy or lymphedema  Allergy / Immunologic:  No eczema, hives, frequent or recurrent infections    OBJECTIVE        Physical    VITALS:  Patient Vitals for the past 24 hrs:   BP Temp Temp src Pulse Resp SpO2 Weight   03/08/23 0731 126/83 98.5 °F (36.9 °C) Oral (!) 105 18 96 % --   03/08/23 0257 -- -- -- -- -- -- 168 lb 14 oz (76.6 kg)   03/07/23 1947 129/79 98.9 °F (37.2 °C) Oral (!) 102 16 96 % --   03/07/23 0931 121/78 -- -- (!) 106 19 98 % --   03/07/23 0926 116/79 -- -- (!) 106 17 97 % --   03/07/23 0921 113/79 -- -- (!) 106 18 97 % --   03/07/23 0915 120/73 -- -- (!) 107 18 96 % --   03/07/23 0910 125/76 -- -- (!) 106 18 -- --   03/07/23 0907 130/77 -- -- (!) 108 20 98 % --   03/07/23 0821 -- -- -- -- 18 92 % -- 03/07/23 0804 -- -- -- (!) 108 -- -- --       24HR INTAKE/OUTPUT:    Intake/Output Summary (Last 24 hours) at 3/8/2023 0801  Last data filed at 3/8/2023 0654  Gross per 24 hour   Intake 750 ml   Output --   Net 750 ml       CONSTITUTIONAL: awake, alert, cooperative, no apparent distress   EYES: pupils equal, round and reactive to light, sclera clear and conjunctiva normal  ENT: Normocephalic, without obvious abnormality, atraumatic  NECK: supple, symmetrical, no jugular venous distension and no carotid bruits   HEMATOLOGIC/LYMPHATIC: no cervical, supraclavicular or axillary lymphadenopathy   LUNGS: no increased work of breathing and clear to auscultation   CARDIOVASCULAR: regular rate and rhythm, normal S1 and S2, no murmur noted  ABDOMEN: normal bowel sounds x 4, soft, non-distended, non-tender, no masses palpated, no hepatosplenomgaly   MUSCULOSKELETAL: full range of motion noted, tone is normal  NEUROLOGIC: awake, alert, oriented to name, place and time. Motor skills grossly intact. SKIN: Normal skin color, texture, turgor and no jaundice. appears intact   EXTREMITIES: no LE edema     DATA:  CBC:    Recent Labs     03/08/23  0359 03/07/23  0518 03/06/23  0458 03/03/23  0505 03/02/23  1457   WBC 11.7* 13.0* 12.8* 10.6 10.4   NEUTROABS  --   --   --  8.3* 8.1*   LYMPHOPCT  --   --   --  14.2 15.6   RBC 3.32* 3.42* 3.40* 3.46* 3.74*   HGB 8.7* 8.8* 8.8* 9.0* 9.7*   HCT 26.9* 27.5* 26.8* 27.2* 29.9*   MCV 81.2 80.6 79.0* 78.6* 80.2   MCH 26.3 25.8* 25.9* 26.0 25.9*   MCHC 32.4 32.0 32.8 33.1 32.3   RDW 23.6* 22.9* 22.2* 21.5* 21.6*    408 430 433 498*       PT/INR:    Recent Labs     03/08/23  0706 03/07/23  0518 03/06/23  0816 03/06/23  0457   PROT 5.5* 5.7*  --  5.6*   INR  --   --  1.39*  --      PTT:  No results for input(s): APTT in the last 720 hours.     CMP:    Recent Labs     03/08/23  0706 03/07/23  0518 03/06/23  0457 03/05/23  0505 03/03/23  0505    138 135* 134* 137   K 3.4* 3.4* 3.3* 3.6 2.8*   CL 98* 99 97* 97* 98*   CO2 27 24 24 26 25   GLUCOSE 104* 92 71 81 104*   BUN 5* 5* 3* 5* 5*   CREATININE 0.7 0.8 0.7 0.9 0.8   LABGLOM >60 >60 >60 >60 >60   CALCIUM 8.4 8.4 8.6 8.5 8.4   PROT 5.5* 5.7* 5.6*  --   --    LABALBU 2.1* 2.4* 2.3*  --   --    AGRATIO 0.6* 0.7* 0.7*  --   --    BILITOT 1.3* 1.2* 1.3*  --   --    ALKPHOS 358* 379* 374*  --   --    ALT 33 33 34  --   --    * 146* 131*  --   --    MG  --  2.00 2.00 2.10 2.10       Lab Results   Component Value Date    CALCIUM 8.4 03/08/2023       LDH:No results for input(s): LDH in the last 720 hours. Radiology Review:  CT GUIDED NEEDLE PLACEMENT  Narrative: PROCEDURE:  CT GUIDED CORE BIOPSY LIVER    CT GUIDED NDL PLACEMENT    MODERATE CONSCIOUS SEDATION    3/7/2023    HISTORY:  ORDERING SYSTEM PROVIDED HISTORY: liver masses  TECHNOLOGIST PROVIDED HISTORY:  Reason for exam:->liver masses; ORDERING SYSTEM PROVIDED HISTORY: no  TECHNOLOGIST PROVIDED HISTORY:  Reason for exam:->no    PHYSICIANS:  Bobbi Robins D.O.    SEDATION:  A total of 1 mg versed and 50 mcg fentanyl were titrated intravenously for  moderate sedation monitored under my direction. Total intra-service time of  sedation was 16 minutes. The patient's vital signs were monitored throughout  the procedure and recorded in the patient's medical record by the nurse. Sedation monitoring started at 9:11 a.m. and ended at 9:27 a.m. CONTRAST:  None    TECHNIQUE:  Informed consent was obtained after a detailed discussion about the procedure  including the risk, benefits, and alternatives. Universal protocol was  followed. Sterile gowns, masks, hats and gloves utilized for maximal sterile  barrier. Axial CT images were obtained through the abdomen and a suitable skin site  was prepped and draped in sterile fashion. Local anesthesia was achieved with  lidocaine.  Core biopsy of a right hepatic lobe lesion anteriorly was  performed with CT guidance via a anterior right abdominal wall approach. Three 18 gauge core biopsy specimens were obtained using coaxial technique. The needle was removed and hemostasis was achieved with manual pressure at  the skin site. The patient tolerated the procedure well and there were no  immediate complications. Estimated blood loss: Minimal (less than 5 mL)    Dose modulation, iterative reconstruction, and/or weight based adjustment of  the mA/kV was utilized to reduce the radiation dose to as low as reasonably  achievable. Total exam DLP: 603.35 mGy-cm    FINDINGS:  Post biopsy CT images demonstrate no evidence of hematoma formation. Impression: Successful CT guided core biopsy of right anterior hepatic lobe lesion. CT NEEDLE BIOPSY LIVER PERCUTANEOUS  Narrative: PROCEDURE:  CT GUIDED CORE BIOPSY LIVER    CT GUIDED NDL PLACEMENT    MODERATE CONSCIOUS SEDATION    3/7/2023    HISTORY:  ORDERING SYSTEM PROVIDED HISTORY: liver masses  TECHNOLOGIST PROVIDED HISTORY:  Reason for exam:->liver masses; ORDERING SYSTEM PROVIDED HISTORY: no  TECHNOLOGIST PROVIDED HISTORY:  Reason for exam:->no    PHYSICIANS:  Marco Levy D.O.    SEDATION:  A total of 1 mg versed and 50 mcg fentanyl were titrated intravenously for  moderate sedation monitored under my direction. Total intra-service time of  sedation was 16 minutes. The patient's vital signs were monitored throughout  the procedure and recorded in the patient's medical record by the nurse. Sedation monitoring started at 9:11 a.m. and ended at 9:27 a.m. CONTRAST:  None    TECHNIQUE:  Informed consent was obtained after a detailed discussion about the procedure  including the risk, benefits, and alternatives. Universal protocol was  followed. Sterile gowns, masks, hats and gloves utilized for maximal sterile  barrier. Axial CT images were obtained through the abdomen and a suitable skin site  was prepped and draped in sterile fashion. Local anesthesia was achieved with  lidocaine.  Core biopsy of a right hepatic lobe lesion anteriorly was  performed with CT guidance via a anterior right abdominal wall approach. Three 18 gauge core biopsy specimens were obtained using coaxial technique. The needle was removed and hemostasis was achieved with manual pressure at  the skin site. The patient tolerated the procedure well and there were no  immediate complications. Estimated blood loss: Minimal (less than 5 mL)    Dose modulation, iterative reconstruction, and/or weight based adjustment of  the mA/kV was utilized to reduce the radiation dose to as low as reasonably  achievable. Total exam DLP: 603.35 mGy-cm    FINDINGS:  Post biopsy CT images demonstrate no evidence of hematoma formation. Impression: Successful CT guided core biopsy of right anterior hepatic lobe lesion. Problem List  Patient Active Problem List   Diagnosis    Dyspnea    Liver metastases ([facility] [address]       ASSESSMENT AND PLAN:  1. Probable stage IV colon cancer. Her colon mass biopsy is positive. Her CT-guided liver biopsy is pending. I asked her if she wanted to be seen here for oncology purposes or UC since her infectious disease doctor is there. She would like to keep all of her care in one place. Therefore, she should get into see a UC oncologist within 1 week. 2.  Anemia. Her hemoglobin is improving with IV iron. It is okay to discharge at any time from my standpoint.             ONCOLOGIC DISPOSITION:      Tacos Alberts MD  Please contact through [facility]

## 2023-03-08 NOTE — DISCHARGE INSTR - COC
Continuity of Care Form    Patient Name: Sree Garduno   :  1965  MRN:  [id number]    Admit date:  3/2/2023  Discharge date:  ***    Code Status Order: Full Code   Advance Directives:   [facility] Documentation       Date/Time Healthcare Directive Type of Healthcare Directive Copy in [facility] [address] Agent's Name Healthcare Agent's Phone Number    23 1028 No, patient does not have an advance directive for healthcare treatment -- -- -- -- --            Admitting Physician:  Randolph Aviles MD  PCP: Xander Dougherty. XIN Carter CNP    Discharging Nurse: [facility] Unit/Room#: Q7V-8133/3123-01  Discharging Unit Phone Number: ***    Emergency Contact:   Extended Emergency Contact Information  Primary Emergency Contact: [facility] Phone: [phone]  Relation: Child  Secondary Emergency Contact: juan j breaux  Home Phone: [phone]  Mobile Phone: [phone]  Relation: Other   needed? No    Past Surgical History:  Past Surgical History:   Procedure Laterality Date    COLONOSCOPY N/A 3/6/2023    COLONOSCOPY POLYPECTOMY SNARE/COLD BIOPSY performed by Deyanira Fuentes MD at [address] [place] [address]  3/6/2023    COLONOSCOPY WITH BIOPSY performed by Deyanira Fuentes MD at [address] [place] [address]  3/6/2023    COLONOSCOPY SUBMUCOSAL TATTOO INJECTION performed by Deyanira Fuentes MD at [facility]  3/7/2023    CT NEEDLE BIOPSY LIVER PERCUTANEOUS 3/7/2023 [facility] CT    UPPER GASTROINTESTINAL ENDOSCOPY N/A 3/6/2023    ESOPHAGOGASTRODUODENOSCOPY performed by Deyanira Fuentes MD at [facility] ENDOSCOPY       Immunization History: There is no immunization history on file for this patient.     Active Problems:  Patient Active Problem List   Diagnosis Code    Dyspnea R06.00    Liver metastases ([facility] [address] C78.7       Isolation/Infection:   Isolation            No Isolation          Patient Infection Status Infection Onset Added Last Indicated Last Indicated By Review Planned Expiration Resolved Resolved By    None active    Resolved    COVID-19 (Rule Out) 01/13/23 01/13/23 01/13/23 COVID-19, Rapid (Ordered)   01/13/23 Rule-Out Test Resulted    COVID-19 (Rule Out) 07/09/20 07/09/20 07/09/20 COVID-19 (Ordered)   07/09/20 Rule-Out Test Resulted            Nurse Assessment:  Last Vital Signs: /83   Pulse (!) 105   Temp 98.5 °F (36.9 °C) (Oral)   Resp 18   Ht 5' 4\" (1.626 m)   Wt 168 lb 14 oz (76.6 kg)   SpO2 96%   BMI 28.99 kg/m²     Last documented pain score (0-10 scale): Pain Level: 0  Last Weight:   Wt Readings from Last 1 Encounters:   03/08/23 168 lb 14 oz (76.6 kg)     Mental Status:  {IP PT MENTAL STATUS:20030}    IV Access:  { LEATHA IV ACCESS:131981706}    Nursing Mobility/ADLs:  Walking   {CHP DME LGPW:265526723}  Transfer  {CHP DME PBMS:511805975}  Bathing  {CHP DME YVOQ:474414728}  Dressing  {CHP DME CCIE:069114742}  Toileting  {CHP DME UWYH:536341583}  Feeding  {CHP DME SPJO:602350773}  Med Admin  {CHP DME TRZH:811410896}  Med Delivery   { LEATHA MED Delivery:557148446}    Wound Care Documentation and Therapy:        Elimination:  Continence: Bowel: {YES / IR:93963}  Bladder: {YES / LB:19752}  Urinary Catheter: {Urinary Catheter:174658699}   Colostomy/Ileostomy/Ileal Conduit: {YES / :28591}       Date of Last BM: ***    Intake/Output Summary (Last 24 hours) at 3/8/2023 1027  Last data filed at 3/8/2023 0918  Gross per 24 hour   Intake 840 ml   Output --   Net 840 ml     I/O last 3 completed shifts:   In: 18 [P.O.:990]  Out: -     Safety Concerns:     [facility] Safety Concerns:011485803}    Impairments/Disabilities:      [address] Impairments/Disabilities:979294366}    Nutrition Therapy:  Current Nutrition Therapy:   [address] Diet List:370672633}    Routes of Feeding: {CHP DME Other Feedings:938295737}  Liquids: {Slp liquid thickness:91025}  Daily Fluid Restriction: {CHP DME Yes amt QDFHIUK:125645842}  Last Modified Barium Swallow with Video (Video Swallowing Test): {Done Not Done PPSZ:477256590}    Treatments at the Time of Hospital Discharge:   Respiratory Treatments: ***  Oxygen Therapy:  {Therapy; copd oxygen:97976}  Ventilator:    {MH CC Vent HGIY:529133583}    Rehab Therapies: {THERAPEUTIC INTERVENTION:3073055755}  Weight Bearing Status/Restrictions: {MH CC Weight Bearin}  Other Medical Equipment (for information only, NOT a DME order):  {EQUIPMENT:757643705}  Other Treatments: ***    Patient's personal belongings (please select all that are sent with patient):  {CHP DME Belongings:085296129}    RN SIGNATURE:  {Esignature:381026260}    CASE MANAGEMENT/SOCIAL WORK SECTION    Inpatient Status Date: ***    Readmission Risk Assessment Score:  Readmission Risk              Risk of Unplanned Readmission:  21           Discharging to Facility/ Agency   Name:   Address:  Phone:  Fax:    Dialysis Facility (if applicable)   Name:  Address:  Dialysis Schedule:  Phone:  Fax:    / signature: {Esignature:659228661}    PHYSICIAN SECTION    Prognosis: Good    Condition at Discharge: Stable    Rehab Potential (if transferring to Rehab): Good    Recommended Labs or Other Treatments After Discharge: follow up with your Cancer doctor and PCP in 1 week    Physician Certification: I certify the above information and transfer of Milagros Martinez  is necessary for the continuing treatment of the diagnosis listed and that she requires Home Care for greater 30 days.      Update Admission H&P: No change in H&P    PHYSICIAN SIGNATURE:  Electronically signed by Handy Vera MD on 3/8/23 at 10:27 AM EST

## 2023-03-14 ENCOUNTER — HOSPITAL ENCOUNTER (EMERGENCY)
Age: 58
Discharge: HOME | End: 2023-03-14
Attending: EMERGENCY MEDICINE
Payer: MEDICAID

## 2023-03-14 ENCOUNTER — APPOINTMENT (OUTPATIENT)
Dept: CT IMAGING | Age: 58
End: 2023-03-14
Payer: MEDICAID

## 2023-03-14 VITALS
TEMPERATURE: 98.8 F | OXYGEN SATURATION: 100 % | RESPIRATION RATE: 31 BRPM | SYSTOLIC BLOOD PRESSURE: 129 MMHG | HEART RATE: 107 BPM | DIASTOLIC BLOOD PRESSURE: 87 MMHG

## 2023-03-14 DIAGNOSIS — C18.9 METASTATIC COLON CANCER TO LIVER (HCC): ICD-10-CM

## 2023-03-14 DIAGNOSIS — R10.84 GENERALIZED ABDOMINAL PAIN: Primary | ICD-10-CM

## 2023-03-14 DIAGNOSIS — C78.7 METASTATIC COLON CANCER TO LIVER (HCC): ICD-10-CM

## 2023-03-14 LAB
ALBUMIN SERPL-MCNC: 2.2 G/DL (ref 3.4–5)
ALBUMIN/GLOB SERPL: 0.5 {RATIO} (ref 1.1–2.2)
ALP SERPL-CCNC: 427 U/L (ref 40–129)
ALT SERPL-CCNC: 34 U/L (ref 10–40)
ANION GAP SERPL CALCULATED.3IONS-SCNC: 17 MMOL/L (ref 3–16)
AST SERPL-CCNC: 144 U/L (ref 15–37)
BASOPHILS # BLD: 0.1 K/UL (ref 0–0.2)
BASOPHILS NFR BLD: 0.6 %
BILIRUB SERPL-MCNC: 1.7 MG/DL (ref 0–1)
BILIRUB UR QL STRIP.AUTO: NEGATIVE
BUN SERPL-MCNC: 5 MG/DL (ref 7–20)
CALCIUM SERPL-MCNC: 9 MG/DL (ref 8.3–10.6)
CHLORIDE SERPL-SCNC: 100 MMOL/L (ref 99–110)
CLARITY UR: CLEAR
CO2 SERPL-SCNC: 23 MMOL/L (ref 21–32)
COLOR UR: YELLOW
CREAT SERPL-MCNC: 0.6 MG/DL (ref 0.6–1.1)
DEPRECATED RDW RBC AUTO: 26.2 % (ref 12.4–15.4)
EOSINOPHIL # BLD: 0 K/UL (ref 0–0.6)
EOSINOPHIL NFR BLD: 0.2 %
GFR SERPLBLD CREATININE-BSD FMLA CKD-EPI: >60 ML/MIN/{1.73_M2}
GLUCOSE SERPL-MCNC: 125 MG/DL (ref 70–99)
GLUCOSE UR STRIP.AUTO-MCNC: NEGATIVE MG/DL
HCT VFR BLD AUTO: 34.2 % (ref 36–48)
HGB BLD-MCNC: 10.7 G/DL (ref 12–16)
HGB UR QL STRIP.AUTO: NEGATIVE
KETONES UR STRIP.AUTO-MCNC: NEGATIVE MG/DL
LEUKOCYTE ESTERASE UR QL STRIP.AUTO: NEGATIVE
LIPASE SERPL-CCNC: 24 U/L (ref 13–60)
LYMPHOCYTES # BLD: 1.5 K/UL (ref 1–5.1)
LYMPHOCYTES NFR BLD: 9.6 %
MCH RBC QN AUTO: 26.6 PG (ref 26–34)
MCHC RBC AUTO-ENTMCNC: 31.3 G/DL (ref 31–36)
MCV RBC AUTO: 84.7 FL (ref 80–100)
MONOCYTES # BLD: 0.9 K/UL (ref 0–1.3)
MONOCYTES NFR BLD: 5.7 %
NEUTROPHILS # BLD: 13.5 K/UL (ref 1.7–7.7)
NEUTROPHILS NFR BLD: 83.9 %
NITRITE UR QL STRIP.AUTO: NEGATIVE
PH UR STRIP.AUTO: 7 [PH] (ref 5–8)
PLATELET # BLD AUTO: 452 K/UL (ref 135–450)
PLATELET BLD QL SMEAR: ABNORMAL
PMV BLD AUTO: 8.7 FL (ref 5–10.5)
POTASSIUM SERPL-SCNC: 3.4 MMOL/L (ref 3.5–5.1)
PROT SERPL-MCNC: 6.4 G/DL (ref 6.4–8.2)
PROT UR STRIP.AUTO-MCNC: NEGATIVE MG/DL
RBC # BLD AUTO: 4.04 M/UL (ref 4–5.2)
SLIDE REVIEW: ABNORMAL
SODIUM SERPL-SCNC: 140 MMOL/L (ref 136–145)
SP GR UR STRIP.AUTO: 1.07 (ref 1–1.03)
UA COMPLETE W REFLEX CULTURE PNL UR: NORMAL
UA DIPSTICK W REFLEX MICRO PNL UR: NORMAL
URN SPEC COLLECT METH UR: NORMAL
UROBILINOGEN UR STRIP-ACNC: 1 E.U./DL
WBC # BLD AUTO: 16.1 K/UL (ref 4–11)

## 2023-03-14 PROCEDURE — 83690 ASSAY OF LIPASE: CPT

## 2023-03-14 PROCEDURE — 81003 URINALYSIS AUTO W/O SCOPE: CPT

## 2023-03-14 PROCEDURE — 96375 TX/PRO/DX INJ NEW DRUG ADDON: CPT

## 2023-03-14 PROCEDURE — 96374 THER/PROPH/DIAG INJ IV PUSH: CPT

## 2023-03-14 PROCEDURE — 99285 EMERGENCY DEPT VISIT HI MDM: CPT

## 2023-03-14 PROCEDURE — 85025 COMPLETE CBC W/AUTO DIFF WBC: CPT

## 2023-03-14 PROCEDURE — 6360000004 HC RX CONTRAST MEDICATION: Performed by: EMERGENCY MEDICINE

## 2023-03-14 PROCEDURE — 74177 CT ABD & PELVIS W/CONTRAST: CPT

## 2023-03-14 PROCEDURE — 6360000002 HC RX W HCPCS: Performed by: EMERGENCY MEDICINE

## 2023-03-14 PROCEDURE — 80053 COMPREHEN METABOLIC PANEL: CPT

## 2023-03-14 RX ORDER — ONDANSETRON 2 MG/ML
4 INJECTION INTRAMUSCULAR; INTRAVENOUS ONCE
Status: COMPLETED | OUTPATIENT
Start: 2023-03-14 | End: 2023-03-14

## 2023-03-14 RX ORDER — MORPHINE SULFATE 4 MG/ML
4 INJECTION, SOLUTION INTRAMUSCULAR; INTRAVENOUS ONCE
Status: COMPLETED | OUTPATIENT
Start: 2023-03-14 | End: 2023-03-14

## 2023-03-14 RX ORDER — OXYCODONE AND ACETAMINOPHEN 10; 325 MG/1; MG/1
1 TABLET ORAL EVERY 6 HOURS PRN
Qty: 12 TABLET | Refills: 0 | Status: SHIPPED | OUTPATIENT
Start: 2023-03-14 | End: 2023-03-17

## 2023-03-14 RX ADMIN — IOPAMIDOL 75 ML: 755 INJECTION, SOLUTION INTRAVENOUS at 17:15

## 2023-03-14 RX ADMIN — MORPHINE SULFATE 4 MG: 4 INJECTION, SOLUTION INTRAMUSCULAR; INTRAVENOUS at 16:04

## 2023-03-14 RX ADMIN — ONDANSETRON 4 MG: 2 INJECTION INTRAMUSCULAR; INTRAVENOUS at 16:03

## 2023-03-14 ASSESSMENT — PAIN DESCRIPTION - LOCATION
LOCATION: ABDOMEN

## 2023-03-14 ASSESSMENT — PAIN SCALES - GENERAL
PAINLEVEL_OUTOF10: 8
PAINLEVEL_OUTOF10: 8
PAINLEVEL_OUTOF10: 0
PAINLEVEL_OUTOF10: 0
PAINLEVEL_OUTOF10: 8

## 2023-03-14 ASSESSMENT — PAIN DESCRIPTION - FREQUENCY
FREQUENCY: CONTINUOUS
FREQUENCY: CONTINUOUS

## 2023-03-14 ASSESSMENT — PAIN DESCRIPTION - ORIENTATION: ORIENTATION: MID

## 2023-03-14 ASSESSMENT — PAIN DESCRIPTION - PAIN TYPE: TYPE: CHRONIC PAIN

## 2023-03-14 ASSESSMENT — PAIN - FUNCTIONAL ASSESSMENT
PAIN_FUNCTIONAL_ASSESSMENT: 0-10
PAIN_FUNCTIONAL_ASSESSMENT: 0-10

## 2023-03-14 NOTE — ED NOTES
Patient does not need to urinate at this time. She states that she went right before being triaged.       Yajaira Corbett RN  03/14/23 6438

## 2023-03-14 NOTE — ED PROVIDER NOTES
[facility]  eMERGENCY dEPARTMENT eNCOUnter      Pt Name: Zion Vasquez  MRN: [id number]  [place] 1965  Date of evaluation: 3/14/2023  Provider: Leigh Ann Taylor MD    CHIEF COMPLAINT       Chief Complaint   Patient presents with    Abdominal Pain     Pt recently dx with colon cancer, pain getting worse         CRITICAL CARE TIME   Total Critical Care time was 0 minutes, excluding separately reportable procedures. There was a high probability of clinically significant/life threatening deterioration in the patient's condition which required my urgent intervention. HISTORY OF PRESENT ILLNESS  (Location/Symptom, Timing/Onset, Context/Setting, Quality, Duration, Modifying Factors, Severity.)   History From: Patient  Limitations to history : None    Zion Vasquez is a 65 y.o. female who presents to the emergency department complaining of abdominal pain. This patient was recently diagnosed with colon cancer. She had a recent liver biopsy done on 3/9/2023. Since her biopsy she has had some increased upper abdominal pain mainly on the right side. She has 5 mg oxycodone. She states if she takes 2 of them she gets some relief from the pain. No vomiting. Her bowels are moving but she states not as much as they should. She is eating. She states she ate breakfast this morning. Nursing Notes were reviewed and I agree. SCREENINGS        [place] Coma Scale  Eye Opening: Spontaneous  Best Verbal Response: Oriented  Best Motor Response: Obeys commands  [place] Coma Scale Score: 15                CIWA Assessment  BP: 124/81  Heart Rate: (!) 105           REVIEW OF SYSTEMS    (2-9 systems for level 4, 10 or more for level 5)     HEENT: Negative. Cardiovascular: No chest pain. Pulmonary: No shortness of breath or cough. GI: Upper abdominal pain, greatest on the right. No nausea vomiting. Decreased bowel movement.   : No frequency urgency or dysuria. Except as noted above the remainder of the review of systems was reviewed and negative.        PAST MEDICAL HISTORY     Past Medical History:   Diagnosis Date    AIDS (acquired immune deficiency syndrome) ([address]          SURGICAL HISTORY       Past Surgical History:   Procedure Laterality Date    COLONOSCOPY N/A 3/6/2023    COLONOSCOPY POLYPECTOMY SNARE/COLD BIOPSY performed by Jordan Bowser MD at [address] [facility]  3/6/2023    COLONOSCOPY WITH BIOPSY performed by Jordan Bowser MD at [address] [place] [address]  3/6/2023    COLONOSCOPY SUBMUCOSAL TATTOO INJECTION performed by Jordan Bowser MD at [address] [facility]  3/7/2023    CT NEEDLE BIOPSY LIVER PERCUTANEOUS 3/7/2023 [facility] CT    UPPER GASTROINTESTINAL ENDOSCOPY N/A 3/6/2023    ESOPHAGOGASTRODUODENOSCOPY [facility] MD [facility]       Previous Medications    ATORVASTATIN (LIPITOR) 10 MG TABLET    Take 1 tablet by mouth daily    BANOPHEN 25 MG CAPSULE    Take 1 capsule by mouth every 6 hours as needed for Itching    BENZOCAINE-MENTHOL (CEPACOL SORE THROAT) 15-10 MG LOZENGE    Take 1 lozenge by mouth every 2 hours as needed for Sore Throat    BIKTARVY -25 MG TABS PER TABLET    Take 1 tablet by mouth daily    FERROUS SULFATE (IRON 325) 325 (65 FE) MG TABLET    Take 1 tablet by mouth 2 times daily    MELATONIN 3 MG TABS TABLET    Take 1 tablet by mouth nightly as needed (sleep)    PANTOPRAZOLE (PROTONIX) 40 MG TABLET    Take 1 tablet by mouth 2 times daily (before meals)    SENNOSIDES-DOCUSATE SODIUM (SENOKOT-S) 8.6-50 MG TABLET    Take 1 tablet by mouth daily for 10 days    SULFAMETHOXAZOLE-TRIMETHOPRIM (BACTRIM DS;SEPTRA DS) 800-160 MG PER TABLET    Take 1 tablet by mouth daily    VENTOLIN  (90 BASE) MCG/ACT INHALER    Inhale 2 puffs into the lungs every 6 hours as needed for Wheezing    VITAMIN D3 (CHOLECALCIFEROL) 25 MCG (1000 UT) TABS TABLET    Take 1 tablet by mouth daily       ALLERGIES     Patient has no known allergies. FAMILY HISTORY     No family history on file. SOCIAL HISTORY       Social History     Socioeconomic History    Marital status:    Tobacco Use    Smoking status: Never    Smokeless tobacco: Never   Substance and Sexual Activity    Alcohol use: Not Currently    Drug use: Not Currently         PHYSICAL EXAM    (up to 7 for level 4, 8 or more for level 5)     ED Triage Vitals [03/14/23 1429]   BP Temp Temp Source Heart Rate Resp SpO2 Height Weight   126/78 98.8 °F (37.1 °C) Temporal (!) 116 18 99 % -- --       General: Alert black female no apparent distress. HEENT: Atraumatic. Pupils equal round reactive. Extraocular movements are intact. Nose is clear. Oropharynx is negative. Neck: Supple, nontender, no adenopathy. Heart: Regular rate and rhythm. No murmurs or gallops noted. Lungs: Breath sounds equal bilaterally and clear. Abdomen: Obese, soft, moderate epigastric right upper quadrant tenderness with guarding but no rebound. No mass organomegaly. Bowel sounds are normal.  No flank tenderness. Skin: Warm and dry, good turgor. No pallor or cyanosis. No diaphoresis      DIFFERENTIAL DIAGNOSIS   Differential includes but is not limited to metastatic colon cancer, complication from needle biopsy, other.       DIAGNOSTIC RESULTS     EKG: All EKG's are interpreted by Gabriele Bishop MD in the absence of a cardiologist.      RADIOLOGY:   Non-plain film images such as CT, Ultrasound and MRI are read by the radiologist. Plain radiographic images are visualized and preliminarily interpreted Gabriele Bishop MD with the below findings:        Interpretation per the Radiologist below, if available at the time of this note:    CT ABDOMEN PELVIS W IV CONTRAST Additional Contrast? None   Final Result   Hepatomegaly and extensive hepatic metastasis      Diverticulosis and focal colonic wall thickening seen in the upper aspect of   the descending colon possibly representing patient's known colonic malignancy. Thickening involving the walls of the lower stomach suggesting antritis      Mild ascites      Gallstone               ED BEDSIDE ULTRASOUND:   Performed by ED Physician - none    LABS:  Labs Reviewed   CBC WITH AUTO DIFFERENTIAL - Abnormal; Notable for the following components:       Result Value    WBC 16.1 (*)     Hemoglobin 10.7 (*)     Hematocrit 34.2 (*)     RDW 26.2 (*)     Platelets 096 (*)     Neutrophils Absolute 13.5 (*)     All other components within normal limits   COMPREHENSIVE METABOLIC PANEL - Abnormal; Notable for the following components:    Potassium 3.4 (*)     Anion Gap 17 (*)     Glucose 125 (*)     BUN 5 (*)     Albumin 2.2 (*)     Albumin/Globulin Ratio 0.5 (*)     Total Bilirubin 1.7 (*)     Alkaline Phosphatase 427 (*)      (*)     All other components within normal limits   LIPASE   URINALYSIS WITH REFLEX TO CULTURE       All other labs were within normal range or not returned as of this dictation. EMERGENCY DEPARTMENT COURSE and DIFFERENTIAL DIAGNOSIS/MDM:   Vitals:    Vitals:    03/14/23 1615 03/14/23 1645 03/14/23 1700 03/14/23 1745   BP: 125/85 116/81 117/77 124/81   Pulse: (!) 112 (!) 109 (!) 107 (!) 105   Resp: 24 28 28 27   Temp:       TempSrc:       SpO2: 99% 98% 98% 98%       Patient was given the following medications:  Medications   morphine (PF) injection 4 mg (4 mg IntraVENous Given 3/14/23 1604)   ondansetron (ZOFRAN) injection 4 mg (4 mg IntraVENous Given 3/14/23 1603)   iopamidol (ISOVUE-370) 76 % injection 75 mL (75 mLs IntraVENous Given 3/14/23 1715)             Is this patient to be included in the SEP-1 Core Measure due to severe sepsis or septic shock? No   Exclusion criteria - the patient is NOT to be included for SEP-1 Core Measure due to:   Infection is not suspected    Chronic Conditions affecting care: Below   has a past medical history of AIDS (acquired immune deficiency syndrome) ([facility] [address] Metastatic colon cancer  CONSULTS: (Who and What was discussed)  None  None        Records Reviewed (Source): Results from biopsy from colon mass and liver biopsy reviewed. Adenocarcinoma. Controlled Substance Monitoring:    Acute and Chronic Pain Monitoring:   RX Monitoring 3/14/2023   Periodic Controlled Substance Monitoring Possible medication side effects, risk of tolerance/dependence & alternative treatments discussed. ;No signs of potential drug abuse or diversion identified. CC/HPI Summary, DDx, ED Course, and Reassessment: Patient presents with abdominal pain and distention. Recently diagnosed with colon cancer. She had a biopsy of a tumor in her colon. She had a liver biopsy done. Following the liver biopsy she had some increased discomfort in her abdomen, more so in her right upper abdomen. She has Percocet which helps some, but she says she has to take 2. She was only given a few pills. No vomiting. She is eating. Her appetite is good. She is a febrile. She was initially tachycardic at 116. She was normotensive. She had some diffuse abdominal tenderness and abdominal distention. She had some guarding the right upper quadrant. No rebound. Her H&H is stable at 10.7 and 34.2. Her white blood cell count 16,100 with 84 neutrophils and 10 lymphs. Her urinalysis shows no pyuria or hematuria to suggest infection or kidney stone. Potassium is minimally decreased at 3.4. Her renal function is normal.  Her AST is 144. Her ALT is 1.7. Her lipase is 24. Her CT abdomen pelvis shows hepatomegaly and extensive hepatic metastasis. Colonic wall thickening seen in the upper aspect of the descending colon. Thickening of the walls of the lower stomach suggesting enteritis. Mild ascites. Gallstone. Clinically I think her pain is from her metastatic disease. She has extensive liver metastasis.   I think the mild liver enzyme elevation is related to her liver metastasis. She has some cholelithiasis, no evidence of cholecystitis on CT. She is eating and drinking normally, I do not think this is related to gallbladder disease. I think she needs some ongoing management of her pain related to her malignancy. I wrote for a 3-day supply of Percocet 10 mg/325. I asked her to contact her oncologist in the next couple of days to discuss further management of her pain. I think she can be discharged home with outpatient follow-up with her oncologist.    Disposition Considerations (tests considered but not done, Admit vs D/C, Shared Decision Making, Pt Expectation of Test or Tx.): Discharge home with short-term pain control, follow-up with her oncologist, Dr. Elisa Hatfield for additional medication for control of her pain. Follow-up with Dr. Elisa Hatfield for continued management of her metastatic colon cancer. Return as needed for any worsening of symptoms or new symptoms of concern. I am the Primary Clinician of Record. PROCEDURES:  None    FINAL IMPRESSION      1. Generalized abdominal pain    2. Metastatic colon cancer to liver [facility])          DISPOSITION/PLAN   DISPOSITION Decision To Discharge 03/14/2023 07:02:50 PM      PATIENT REFERRED TO:  Dalton Webb MD  [address] [facility] [id number] [facility] [id number]    Schedule an appointment as soon as possible for a visit in 3 days      DISCHARGE MEDICATIONS:  New Prescriptions    OXYCODONE-ACETAMINOPHEN (PERCOCET)  MG PER TABLET    Take 1 tablet by mouth every 6 hours as needed for Pain for up to 3 days.  Intended supply: 30 days Max Daily Amount: 4 tablets       (Please note that portions of this note were completed with a voice recognition program.  Efforts were made to edit the dictations but occasionally words are mis-transcribed.)    Scott Saavedra MD  Attending Emergency Physician        Henry Charlton MD  03/14/23 [id number]

## 2023-03-14 NOTE — ED NOTES
Discharge and education instructions reviewed. Patient verbalized understanding, teach-back successful. Patient denied questions at this time. No acute distress noted. Patient instructed to follow-up as noted - return to emergency department if symptoms worsen. Patient verbalized understanding. Discharged per EDMD with discharged instructions.      Iona Michael RN  03/14/23 [id number]

## 2023-03-14 NOTE — DISCHARGE INSTRUCTIONS
Percocet prescribed for pain. As discussed you should call your oncologist to discuss further pain management.   As we discussed you may require additional ongoing pain medication which can be prescribed by your oncologist.

## 2023-06-28 ENCOUNTER — APPOINTMENT (OUTPATIENT)
Dept: CT IMAGING | Age: 58
End: 2023-06-28
Payer: MEDICAID

## 2023-06-28 ENCOUNTER — HOSPITAL ENCOUNTER (EMERGENCY)
Age: 58
Discharge: HOME | End: 2023-06-28
Attending: EMERGENCY MEDICINE
Payer: MEDICAID

## 2023-06-28 VITALS
RESPIRATION RATE: 11 BRPM | SYSTOLIC BLOOD PRESSURE: 130 MMHG | HEIGHT: 63 IN | BODY MASS INDEX: 22.5 KG/M2 | HEART RATE: 102 BPM | WEIGHT: 126.98 LBS | OXYGEN SATURATION: 100 % | DIASTOLIC BLOOD PRESSURE: 101 MMHG | TEMPERATURE: 97.9 F

## 2023-06-28 DIAGNOSIS — Z85.038 HISTORY OF COLON CANCER: Primary | ICD-10-CM

## 2023-06-28 DIAGNOSIS — R52 UNCONTROLLED PAIN: ICD-10-CM

## 2023-06-28 LAB
ALBUMIN SERPL-MCNC: 2.3 G/DL (ref 3.4–5)
ALBUMIN/GLOB SERPL: 0.5 {RATIO} (ref 1.1–2.2)
ALP SERPL-CCNC: 640 U/L (ref 40–129)
ALT SERPL-CCNC: 19 U/L (ref 10–40)
ANION GAP SERPL CALCULATED.3IONS-SCNC: 13 MMOL/L (ref 3–16)
AST SERPL-CCNC: 81 U/L (ref 15–37)
BACTERIA URNS QL MICRO: NORMAL /HPF
BASOPHILS # BLD: 0 K/UL (ref 0–0.2)
BASOPHILS NFR BLD: 0.2 %
BILIRUB SERPL-MCNC: 2.4 MG/DL (ref 0–1)
BILIRUB UR QL STRIP.AUTO: NEGATIVE
BUN SERPL-MCNC: 4 MG/DL (ref 7–20)
CALCIUM SERPL-MCNC: 8.9 MG/DL (ref 8.3–10.6)
CHLORIDE SERPL-SCNC: 102 MMOL/L (ref 99–110)
CLARITY UR: CLEAR
CO2 SERPL-SCNC: 21 MMOL/L (ref 21–32)
COLOR UR: ABNORMAL
CREAT SERPL-MCNC: 0.5 MG/DL (ref 0.6–1.1)
DEPRECATED RDW RBC AUTO: 17.4 % (ref 12.4–15.4)
EOSINOPHIL # BLD: 0 K/UL (ref 0–0.6)
EOSINOPHIL NFR BLD: 0.1 %
EPI CELLS #/AREA URNS AUTO: 5 /HPF (ref 0–5)
GFR SERPLBLD CREATININE-BSD FMLA CKD-EPI: >60 ML/MIN/{1.73_M2}
GLUCOSE SERPL-MCNC: 125 MG/DL (ref 70–99)
GLUCOSE UR STRIP.AUTO-MCNC: NEGATIVE MG/DL
HCT VFR BLD AUTO: 32.4 % (ref 36–48)
HGB BLD-MCNC: 10.5 G/DL (ref 12–16)
HGB UR QL STRIP.AUTO: NEGATIVE
HYALINE CASTS #/AREA URNS AUTO: 0 /LPF (ref 0–8)
KETONES UR STRIP.AUTO-MCNC: ABNORMAL MG/DL
LEUKOCYTE ESTERASE UR QL STRIP.AUTO: ABNORMAL
LIPASE SERPL-CCNC: 28 U/L (ref 13–60)
LYMPHOCYTES # BLD: 0.8 K/UL (ref 1–5.1)
LYMPHOCYTES NFR BLD: 15 %
MCH RBC QN AUTO: 32.2 PG (ref 26–34)
MCHC RBC AUTO-ENTMCNC: 32.3 G/DL (ref 31–36)
MCV RBC AUTO: 99.7 FL (ref 80–100)
MONOCYTES # BLD: 0.6 K/UL (ref 0–1.3)
MONOCYTES NFR BLD: 10.1 %
NEUTROPHILS # BLD: 4.1 K/UL (ref 1.7–7.7)
NEUTROPHILS NFR BLD: 74.6 %
NITRITE UR QL STRIP.AUTO: NEGATIVE
PH UR STRIP.AUTO: 6.5 [PH] (ref 5–8)
PLATELET # BLD AUTO: 198 K/UL (ref 135–450)
PMV BLD AUTO: 8.1 FL (ref 5–10.5)
POTASSIUM SERPL-SCNC: 5.2 MMOL/L (ref 3.5–5.1)
PROT SERPL-MCNC: 6.5 G/DL (ref 6.4–8.2)
PROT UR STRIP.AUTO-MCNC: NEGATIVE MG/DL
RBC # BLD AUTO: 3.25 M/UL (ref 4–5.2)
RBC CLUMPS #/AREA URNS AUTO: 0 /HPF (ref 0–4)
SODIUM SERPL-SCNC: 136 MMOL/L (ref 136–145)
SP GR UR STRIP.AUTO: 1.01 (ref 1–1.03)
UA COMPLETE W REFLEX CULTURE PNL UR: ABNORMAL
UA DIPSTICK W REFLEX MICRO PNL UR: YES
URN SPEC COLLECT METH UR: ABNORMAL
UROBILINOGEN UR STRIP-ACNC: 2 E.U./DL
WBC # BLD AUTO: 5.4 K/UL (ref 4–11)
WBC #/AREA URNS AUTO: 3 /HPF (ref 0–5)

## 2023-06-28 PROCEDURE — 96374 THER/PROPH/DIAG INJ IV PUSH: CPT

## 2023-06-28 PROCEDURE — 81001 URINALYSIS AUTO W/SCOPE: CPT

## 2023-06-28 PROCEDURE — 99285 EMERGENCY DEPT VISIT HI MDM: CPT

## 2023-06-28 PROCEDURE — 80053 COMPREHEN METABOLIC PANEL: CPT

## 2023-06-28 PROCEDURE — 74177 CT ABD & PELVIS W/CONTRAST: CPT

## 2023-06-28 PROCEDURE — 6360000004 HC RX CONTRAST MEDICATION: Performed by: EMERGENCY MEDICINE

## 2023-06-28 PROCEDURE — 6360000002 HC RX W HCPCS: Performed by: EMERGENCY MEDICINE

## 2023-06-28 PROCEDURE — 85025 COMPLETE CBC W/AUTO DIFF WBC: CPT

## 2023-06-28 PROCEDURE — 83690 ASSAY OF LIPASE: CPT

## 2023-06-28 RX ORDER — MORPHINE SULFATE 4 MG/ML
4 INJECTION, SOLUTION INTRAMUSCULAR; INTRAVENOUS ONCE
Status: COMPLETED | OUTPATIENT
Start: 2023-06-28 | End: 2023-06-28

## 2023-06-28 RX ADMIN — MORPHINE SULFATE 4 MG: 4 INJECTION, SOLUTION INTRAMUSCULAR; INTRAVENOUS at 02:23

## 2023-06-28 RX ADMIN — IOPAMIDOL 75 ML: 755 INJECTION, SOLUTION INTRAVENOUS at 03:25

## 2023-06-28 ASSESSMENT — PAIN DESCRIPTION - PAIN TYPE: TYPE: ACUTE PAIN

## 2023-06-28 ASSESSMENT — ENCOUNTER SYMPTOMS
NAUSEA: 0
SORE THROAT: 0
SHORTNESS OF BREATH: 0
DIARRHEA: 0
VOMITING: 0
CHEST TIGHTNESS: 0
ABDOMINAL PAIN: 1
CONSTIPATION: 0

## 2023-06-28 ASSESSMENT — PAIN SCALES - GENERAL
PAINLEVEL_OUTOF10: 0
PAINLEVEL_OUTOF10: 10

## 2023-06-28 ASSESSMENT — PAIN - FUNCTIONAL ASSESSMENT
PAIN_FUNCTIONAL_ASSESSMENT: NONE - DENIES PAIN
PAIN_FUNCTIONAL_ASSESSMENT: 0-10
PAIN_FUNCTIONAL_ASSESSMENT: ACTIVITIES ARE NOT PREVENTED

## 2023-06-28 ASSESSMENT — LIFESTYLE VARIABLES
HOW MANY STANDARD DRINKS CONTAINING ALCOHOL DO YOU HAVE ON A TYPICAL DAY: PATIENT DOES NOT DRINK
HOW OFTEN DO YOU HAVE A DRINK CONTAINING ALCOHOL: NEVER

## 2023-06-28 ASSESSMENT — PAIN DESCRIPTION - LOCATION: LOCATION: ABDOMEN

## (undated) DEVICE — FORCEPS BX 240CM 2.4MM L NDL RAD JAW 4 M00513334

## (undated) DEVICE — BITE BLOCK ENDOSCP AD 60 FR W/ ADJ STRP PLAS GRN BLOX

## (undated) DEVICE — NEEDLE 25GAX5.0MM INJ CARR LOCKE

## (undated) DEVICE — SNARES COLD OVAL 10MM THIN

## (undated) DEVICE — ENDOSCOPY KIT: Brand: MEDLINE INDUSTRIES, INC.

## (undated) DEVICE — TRAP POLYP ETRAP

## (undated) DEVICE — FORMALIN CLEAR VIAL 20 ML 10%